# Patient Record
Sex: MALE | Race: WHITE
[De-identification: names, ages, dates, MRNs, and addresses within clinical notes are randomized per-mention and may not be internally consistent; named-entity substitution may affect disease eponyms.]

---

## 2018-11-06 ENCOUNTER — HOSPITAL ENCOUNTER (EMERGENCY)
Dept: HOSPITAL 75 - ER | Age: 68
Discharge: HOME | End: 2018-11-06
Payer: COMMERCIAL

## 2018-11-06 VITALS — WEIGHT: 170 LBS | HEIGHT: 69 IN | BODY MASS INDEX: 25.18 KG/M2

## 2018-11-06 VITALS — DIASTOLIC BLOOD PRESSURE: 80 MMHG | SYSTOLIC BLOOD PRESSURE: 163 MMHG

## 2018-11-06 DIAGNOSIS — Z87.448: ICD-10-CM

## 2018-11-06 DIAGNOSIS — L03.115: Primary | ICD-10-CM

## 2018-11-06 DIAGNOSIS — M79.604: ICD-10-CM

## 2018-11-06 DIAGNOSIS — I73.9: ICD-10-CM

## 2018-11-06 DIAGNOSIS — Z86.19: ICD-10-CM

## 2018-11-06 LAB
ALBUMIN SERPL-MCNC: 3.8 GM/DL (ref 3.2–4.5)
ALP SERPL-CCNC: 93 U/L (ref 40–136)
ALT SERPL-CCNC: 28 U/L (ref 0–55)
APTT BLD: 34 SEC (ref 24–35)
BASOPHILS # BLD AUTO: 0 10^3/UL (ref 0–0.1)
BASOPHILS NFR BLD AUTO: 0 % (ref 0–10)
BILIRUB SERPL-MCNC: 0.5 MG/DL (ref 0.1–1)
BUN/CREAT SERPL: 15
CALCIUM SERPL-MCNC: 9.6 MG/DL (ref 8.5–10.1)
CHLORIDE SERPL-SCNC: 105 MMOL/L (ref 98–107)
CO2 SERPL-SCNC: 23 MMOL/L (ref 21–32)
CREAT SERPL-MCNC: 0.85 MG/DL (ref 0.6–1.3)
EOSINOPHIL # BLD AUTO: 0.7 10^3/UL (ref 0–0.3)
EOSINOPHIL NFR BLD AUTO: 8 % (ref 0–10)
ERYTHROCYTE [DISTWIDTH] IN BLOOD BY AUTOMATED COUNT: 13 % (ref 10–14.5)
GFR SERPLBLD BASED ON 1.73 SQ M-ARVRAT: > 60 ML/MIN
GLUCOSE SERPL-MCNC: 101 MG/DL (ref 70–105)
HCT VFR BLD CALC: 41 % (ref 40–54)
HGB BLD-MCNC: 13.8 G/DL (ref 13.3–17.7)
INR PPP: 1.1 (ref 0.8–1.4)
LYMPHOCYTES # BLD AUTO: 1.4 X 10^3 (ref 1–4)
LYMPHOCYTES NFR BLD AUTO: 15 % (ref 12–44)
MANUAL DIFFERENTIAL PERFORMED BLD QL: NO
MCH RBC QN AUTO: 31 PG (ref 25–34)
MCHC RBC AUTO-ENTMCNC: 33 G/DL (ref 32–36)
MCV RBC AUTO: 94 FL (ref 80–99)
MONOCYTES # BLD AUTO: 1 X 10^3 (ref 0–1)
MONOCYTES NFR BLD AUTO: 11 % (ref 0–12)
NEUTROPHILS # BLD AUTO: 6 X 10^3 (ref 1.8–7.8)
NEUTROPHILS NFR BLD AUTO: 66 % (ref 42–75)
PLATELET # BLD: 292 10^3/UL (ref 130–400)
PMV BLD AUTO: 9.6 FL (ref 7.4–10.4)
POTASSIUM SERPL-SCNC: 3.9 MMOL/L (ref 3.6–5)
PROT SERPL-MCNC: 7.8 GM/DL (ref 6.4–8.2)
PROTHROMBIN TIME: 13.7 SEC (ref 12.2–14.7)
RBC # BLD AUTO: 4.41 10^6/UL (ref 4.35–5.85)
SODIUM SERPL-SCNC: 140 MMOL/L (ref 135–145)
WBC # BLD AUTO: 9.2 10^3/UL (ref 4.3–11)

## 2018-11-06 PROCEDURE — 83605 ASSAY OF LACTIC ACID: CPT

## 2018-11-06 PROCEDURE — 36415 COLL VENOUS BLD VENIPUNCTURE: CPT

## 2018-11-06 PROCEDURE — 93970 EXTREMITY STUDY: CPT

## 2018-11-06 PROCEDURE — 80053 COMPREHEN METABOLIC PANEL: CPT

## 2018-11-06 PROCEDURE — 87040 BLOOD CULTURE FOR BACTERIA: CPT

## 2018-11-06 PROCEDURE — 85025 COMPLETE CBC W/AUTO DIFF WBC: CPT

## 2018-11-06 PROCEDURE — 93925 LOWER EXTREMITY STUDY: CPT

## 2018-11-06 PROCEDURE — 85730 THROMBOPLASTIN TIME PARTIAL: CPT

## 2018-11-06 PROCEDURE — 85610 PROTHROMBIN TIME: CPT

## 2018-11-06 NOTE — XMS REPORT
Continuity of Care Document

 Created on: 2018



NATY ARENAS

External Reference #: [id number]

: 1950

Sex: Male



Demographics







 Address  [address]

[place]  [address]

 

 Home Phone  [phone] x

 

 Preferred Language  Unknown

 

 Marital Status  Unknown

 

 Latter day Affiliation  Unknown

 

 Race  Unknown

 

 Ethnic Group  Unknown





Author







 Author  [facility]

 

 Organization  [facility]

 

 Address  Unknown

 

 Phone  Unavailable



              



Allergies

      





 Active            Description            Code            Type            
Severity            Reaction            Onset            Reported/Identified   
         Relationship to Patient            Clinical Status        

 

 Yes            No Known Drug Allergies            [id number]            Drug 
Allergy            Unknown            N/A                         2012   
                               



                  



Medications

      



There is no data.                  



Problems

      





 Date Dx Coded            Attending            Type            Code            
Diagnosis            Diagnosed By        

 

 2014            TAWIL MD, ESPINOZA VENTURA            Ot            604.90       
                           



                  



Procedures

      



There is no data.                  



Results

      



There is no data.              



Encounters

      





 ACCT No.            Visit Date/Time            Discharge            Status    
        Pt. Type            Provider            Facility            Loc./Unit  
          Complaint        

 

 [id number]            2014 18:35:00            2014 20:49:00    
        [facility]            Emergency                                               
             

 

 [id number]            2014 19:54:00            2014 00:01:00    
        [facility]            Outpatient            TAWIL MD, ESPINOZA VENTURA            [facility]            [facility]                     

 

 [id number]            2014 10:20:00            2014 17:00:00    
        [facility]            Inpatient                                               
             

 

 [id number]            2014 15:08:00            2014 23:59:59    
        [facility]            Outpatient                                              
              

 

 [id number]            2013 14:15:00            2013 15:15:00    
        [facility]            Outpatient                                              
              

 

 [id number]            2013 08:16:00            2013 23:59:59    
        [facility]            Outpatient

## 2018-11-06 NOTE — ED LOWER EXTREMITY
General


Chief Complaint:  Lower Extremity


Stated Complaint:  LEG INFECTION


Nursing Triage Note:  


PT SENT OVER FROM [facility]. PT HAS POSSIBLE RIGHT LEG INFECTION. PT 


STATES HIS LEG HAS BEEN LIKE THIS FOR 2 WEEKS.


Nursing Sepsis Screen:  No Definite Risk


Source:  patient


Exam Limitations:  no limitations





History of Present Illness


Date Seen by Provider:  2018


Time Seen by Provider:  14:45


Initial Comments


Patient is a 68-year-old male who presents to the emergency room from Dr. Hare's office for a possible right leg infection. Patient has redness, 

swelling, skin flaking off to the right lower extremity around the area of his 

shin and has significant pedal edema on the right leg. His left leg is also 

swollen, brown discoloration in color, and skin flaking off. There is no 

redness noted to the left leg. He reports that the redness in the right leg 

started when he fell 2 weeks ago and scraped his right knee. He has been seen a 

couple of times in Dr. Hare's office and he's been on Keflex by mouth.


Onset:  just prior to arrival


Pain/Injury Location:  bilateral leg


Method of Injury:  fell





Allergies and Home Medications


Allergies


Coded Allergies:  


     No Known Drug Allergies (Unverified , 3/23/12)





Home Medications


Amlodipine Besylate 5 Mg Tablet, 5 MG PO DAILY, (Reported)


Finasteride 5 Mg Tab, 5 MG PO HS, (Reported)


Meclizine Hcl 25 Mg Tab, 1 TAB PO TID PRN for DIZZINESS


   Prescribed by: CELSA ELLIS on 14


Metoprolol Succinate 50 Mg Tab.sr.24h, 50 MG PO DAILY, (Reported)


Pantoprazole Sodium 40 Mg Tablet.dr, 40 MG PO DAILY, (Reported)


   TAKE 30 MINUTES BEFORE BREAKFAST 


Pravastatin Sodium 20 Mg Tablet, 20 MG PO DAILY, (Reported)


Sulfamethoxazole/Trimethoprim 1 Each Tablet, 1 EACH PO BID


   Prescribed by: LUIS MIGUEL HARVEY on 18 547





Patient Home Medication List


Home Medication List Reviewed:  Yes





Review of Systems


Constitutional:  no symptoms reported, see HPI; No chills, No fever


Skin:  see HPI, change in color, other (swelling to the lower extremities 

bilaterally, brown discoloration to the left leg, redness to the right lower 

leg.)





Past Medical-Social-Family Hx


Past Med/Social Hx:  Reviewed Nursing Past Med/Soc Hx


Patient Social History


Alcohol Use:  Denies Use


Recreational Drug Use:  No


Smoking Status:  Never a Smoker


Recent Foreign Travel:  No


Contact w/Someone Who Travel:  No


Recent Infectious Disease Expo:  No


Recent Hopitalizations:  No





Immunizations Up To Date


Tetanus Booster (TDap):  Unknown





Past Medical History


Surgeries:  Yes (APPY, HEART CATH X'S 2)


Respiratory:  No


Cardiac:  Yes (SVT)


Neurological:  Yes (Pt says "herpes")


Reproductive Disorders:  No


Benign Prostatic Hyperpl


Gastrointestinal:  No


Musculoskeletal:  No


Endocrine:  No


Cancer:  No


Psychosocial:  No


Integumentary:  No


Blood Disorders:  No





Family Medical History


Reviewed Nursing Family Hx





Patient reports no known family medical history.





Physical Exam


Vital Signs





Vital Signs - First Documented








 18





 14:45


 


Temp 98.3


 


Pulse 86


 


Resp 18


 


B/P (MAP) 176/82 (113)


 


Pulse Ox 99


 


O2 Delivery Room Air





Capillary Refill : Less Than 3 Seconds


Height, Weight, BMI


Height: 5'9.00"


Weight: 170lbs. 8oz. 77.087889fg;  BMI


Method:Stated


General Appearance:  WD/WN, no apparent distress


Cardiovascular:  normal peripheral pulses, regular rate, rhythm, no edema, no 

gallop, no JVD, no murmur


Respiratory:  chest non-tender, lungs clear, normal breath sounds, no 

respiratory distress, no accessory muscle use


Legs:  right leg soft tissue tenderness; bilateral leg swelling (pedal edema, 

flaking skin), bilateral leg other (left leg has a brown discoloration to his 

shin and left ankle, right leg is red in color and warm to the touch.)


Ankles:  bilateral ankle swelling


Neurologic/Tendon:  normal sensation, normal motor functions, normal tendon 

functions, responds to pain, no evidence tendon injury


Neurologic/Psychiatric:  alert, normal mood/affect, oriented x 3


Skin:  normal color, warm/dry


Normal capillary refill and distal pedal pulses.





Progress/Results/Core Measures


Results/Orders


Lab Results





Laboratory Tests








Test


 18


14:45 Range/Units


 


 


White Blood Count


 9.2 


 4.3-11.0


10^3/uL


 


Red Blood Count


 4.41 


 4.35-5.85


10^6/uL


 


Hemoglobin 13.8  13.3-17.7  G/DL


 


Hematocrit 41  40-54  %


 


Mean Corpuscular Volume 94  80-99  FL


 


Mean Corpuscular Hemoglobin 31  25-34  PG


 


Mean Corpuscular Hemoglobin


Concent 33 


 32-36  G/DL





 


Red Cell Distribution Width 13.0  10.0-14.5  %


 


Platelet Count


 292 


 130-400


10^3/uL


 


Mean Platelet Volume 9.6  7.4-10.4  FL


 


Neutrophils (%) (Auto) 66  42-75  %


 


Lymphocytes (%) (Auto) 15  12-44  %


 


Monocytes (%) (Auto) 11  0-12  %


 


Eosinophils (%) (Auto) 8  0-10  %


 


Basophils (%) (Auto) 0  0-10  %


 


Neutrophils # (Auto) 6.0  1.8-7.8  X 10^3


 


Lymphocytes # (Auto) 1.4  1.0-4.0  X 10^3


 


Monocytes # (Auto) 1.0  0.0-1.0  X 10^3


 


Eosinophils # (Auto)


 0.7 H


 0.0-0.3


10^3/uL


 


Basophils # (Auto)


 0.0 


 0.0-0.1


10^3/uL


 


Prothrombin Time 13.7  12.2-14.7  SEC


 


INR Comment 1.1  0.8-1.4  


 


Activated Partial


Thromboplast Time 34 


 24-35  SEC





 


Sodium Level 140  135-145  MMOL/L


 


Potassium Level 3.9  3.6-5.0  MMOL/L


 


Chloride Level 105    MMOL/L


 


Carbon Dioxide Level 23  21-32  MMOL/L


 


Anion Gap 12  5-14  MMOL/L


 


Blood Urea Nitrogen 13  7-18  MG/DL


 


Creatinine


 0.85 


 0.60-1.30


MG/DL


 


Estimat Glomerular Filtration


Rate > 60 


  





 


BUN/Creatinine Ratio 15   


 


Glucose Level 101    MG/DL


 


Lactic Acid Level


 1.33 


 0.50-2.00


MMOL/L


 


Calcium Level 9.6  8.5-10.1  MG/DL


 


Corrected Calcium 9.8  8.5-10.1  MG/DL


 


Total Bilirubin 0.5  0.1-1.0  MG/DL


 


Aspartate Amino Transf


(AST/SGOT) 24 


 5-34  U/L





 


Alanine Aminotransferase


(ALT/SGPT) 28 


 0-55  U/L





 


Alkaline Phosphatase 93    U/L


 


Total Protein 7.8  6.4-8.2  GM/DL


 


Albumin 3.8  3.2-4.5  GM/DL








Micro Results





Microbiology


18 Blood Culture - Preliminary, Resulted


          No growth


11/6/18 Blood Culture - Final, Complete


          No growth


18 Blood Culture - Final, Complete


          No growth





My Orders





Orders - LUIS MIGUEL HARVEY


Cbc With Automated Diff (18 14:36)


Comprehensive Metabolic Panel (18 14:36)


Blood Culture (18 14:36)


Protime With Inr (18 14:36)


Partial Thromboplastin Time (18 14:36)


Saline Lock/Iv-Start (18 14:36)


Vital Signs Adult Sepsis Patie Q15M (18 14:36)


O2 (18 14:36)


Remove Rings In Anticipation O (18 14:36)


Lactic Acid Analyzer (18 14:36)


Blood Culture (18 15:08)


Us Brianda Lower Ext [id number] (18 15:41)


Us Venous Lower Ext Brianda (18 15:41)





Vital Signs/I&O











 18





 14:45 17:59


 


Temp 98.3 


 


Pulse 86 85


 


Resp 18 20


 


B/P (MAP) 176/82 (113) 163/80 (107)


 


Pulse Ox 99 99


 


O2 Delivery Room Air 














Blood Pressure Mean:  113











Progress


Progress Note :  


   Time:  17:50


Progress Note


I have seen and evaluated the patient. I have spoke to Dr. Hare regarding 

the patients US findings. He would like the patient to see the patient in his 

office next thursday and to call Dr Pickering office for an appointment time. The 

patient agrees with plan of care, return precautions were given.





Diagnostic Imaging





   Diagonstic Imaging:  Ultrasound


   Plain Films/CT/US/NM/MRI:  leg


Comments





NAME:      NATY ARENAS [facility] REC#:   [id number]


ACCOUNT#:   [id number]


PHYSICIAN:    LUIS MIGUEL HARVEY


 








CC:   LUIS MIGUEL HARVEY; SARAI SANZ MD


 Page 2 of 2


 RADIOLOGY REPORT





 [facility].


 [place], [place]





CC:   LUIS MIGUEL HARVEY; SARAI SANZ MD


 Page 1 of 1


 RADIOLOGY REPORT





NAME:      NATY ARENAS


[facility] REC#:   [id number]


ACCOUNT#:   [id number]


PT STATUS:   REG ER


:      1950


PHYSICIAN:    LUIS MIGUEL HARVEY


ADMIT DATE:   18/ER


 ***Signed***


Date of Exam:   18





US BRIANDA LOWER EXT [id number]


 





PROCEDURE: US Bilateral lower extremity arterial.





TECHNIQUE: Multiple real-time grayscale images are obtained


through both lower extremity arterial systems with color Doppler


imaging and color Doppler spectral analysis.





INDICATION: Bilateral lower extremity swelling with some


discoloration.





FINDINGS: Real-time imaging shows scattered plaquing throughout


the lower extremity arterial system bilaterally.





The right lower extremity shows normal biphasic flow from the


external iliac artery to the popliteal level. There is monophasic


flow in the distal popliteal artery with monophasic flow to the


anterior tibial artery. Posterior tibial artery appears occluded.





Left lower extremity shows biphasic flow from the external iliac


vein throughout to the ankle. There is good velocity in the


posterior tibial and dorsalis pedis artery with biphasic


waveform.





IMPRESSION:


1. Hemodynamic changes are present estimated greater than 70% in


the distal popliteal artery on the right with occlusion of the


posterior tibial artery.


2. Moderate atherosclerotic changes on the left with no


hemodynamic stenosis demonstrated with Doppler sampling.





Dictated by: 





  Dictated on workstation # [id number]





[id number]





Dict:      18


Trans:      18





Interpreted by:         SARAI SANZ MD


Electronically signed by:   SARAI SANZ MD 18   





NAME:      NATY ARENAS


[facility] REC#:   [id number]


ACCOUNT#:   [id number]


PHYSICIAN:    LUIS MIGUEL HARVEY


 








CC:   LUIS MIGUEL HARVEY; SARAI SANZ MD


 Page 1 of 1


 RADIOLOGY REPORT





 [facility] [place], [place]





CC:   LONNIE HARVEY ERIC D MD


 Page 1 of 1


 RADIOLOGY REPORT





NAME:      NATY ARENAS


MED REC#:   [id number]


ACCOUNT#:   [id number]


PT STATUS:   REG ER


:      1950


PHYSICIAN:    LUIS MIGUEL HARVEY


ADMIT DATE:   18/ER


 ***Signed***


Date of Exam:   18





US VENOUS LOWER EXT BRIANDA


 





PROCEDURE: US Venous Lower Ext Brianda.





TECHNIQUE: Multiple real-time grayscale images were obtained over


the lower extremities in various projections, bilaterally.


Additional duplex Doppler and color Doppler images were also


obtained.





INDICATION: Bilateral lower extremity edema.





FINDINGS: There is normal color Doppler imaging from the external


iliac vein to the ankle bilaterally. Calf compression shows


normal augmentation of flow in the popliteal vein bilaterally. No


evidence of popliteal cyst.





IMPRESSION: No evidence of lower extremity venous thrombosis.





Dictated by: 





  Dictated on workstation # [id number]





[id number]





Dict:      18 1643


Trans:      184





Interpreted by:         SARAI SANZ MD


Electronically signed by:   SARAI SANZ 


   Reviewed:  Reviewed by Me, Reviewed/Discussed





Departure


Impression





 Primary Impression:  


 Cellulitis


 Additional Impression:  


 Vascular disease, peripheral


Disposition:  01 HOME, SELF-CARE


Condition:  Stable/Unchanged





Departure-Patient Inst.


Decision time for Depature:  17:51


Referrals:  


MENDY HARE DO (PCP/Family)


Primary Care Physician








EDNA LYONS MD


Patient Instructions:  Cellulitis (Skin Infection), Adult (DC)





Add. Discharge Instructions:  


Take medication as directed. Follow-up with Dr. Hare's office on Thursday. 

Call tomorrow morning for an appointment time. Call Dr. Lyons's office to 

schedule an appointment in the near future for discussion of your ultrasound 

findings. Return back to the emergency room for any worsening symptoms or 

concerns as needed. All discharge instructions reviewed with patient and/or 

family. Voiced understanding.


Scripts


Sulfamethoxazole/Trimethoprim (Bactrim Ds Tablet) 1 Each Tablet


1 EACH PO BID for 7 Days, #14 TAB


   Prov: LUIS MIGUEL HARVEY         18











LUIS MIGUEL HARVEY 2018 16:06

## 2018-11-06 NOTE — DIAGNOSTIC IMAGING REPORT
PROCEDURE: US Venous Lower Ext Rob.



TECHNIQUE: Multiple real-time grayscale images were obtained over

the lower extremities in various projections, bilaterally.

Additional duplex Doppler and color Doppler images were also

obtained.



INDICATION: Bilateral lower extremity edema.



FINDINGS: There is normal color Doppler imaging from the external

iliac vein to the ankle bilaterally. Calf compression shows

normal augmentation of flow in the popliteal vein bilaterally. No

evidence of popliteal cyst.



IMPRESSION: No evidence of lower extremity venous thrombosis.



Dictated by: 



  Dictated on workstation # [id number]

## 2018-11-06 NOTE — DIAGNOSTIC IMAGING REPORT
PROCEDURE: US Bilateral lower extremity arterial.



TECHNIQUE: Multiple real-time grayscale images are obtained

through both lower extremity arterial systems with color Doppler

imaging and color Doppler spectral analysis.



INDICATION: Bilateral lower extremity swelling with some

discoloration.



FINDINGS: Real-time imaging shows scattered plaquing throughout

the lower extremity arterial system bilaterally.



The right lower extremity shows normal biphasic flow from the

external iliac artery to the popliteal level. There is monophasic

flow in the distal popliteal artery with monophasic flow to the

anterior tibial artery. Posterior tibial artery appears occluded.



Left lower extremity shows biphasic flow from the external iliac

vein throughout to the ankle. There is good velocity in the

posterior tibial and dorsalis pedis artery with biphasic

waveform.



IMPRESSION:

1. Hemodynamic changes are present estimated greater than 70% in

the distal popliteal artery on the right with occlusion of the

posterior tibial artery.

2. Moderate atherosclerotic changes on the left with no

hemodynamic stenosis demonstrated with Doppler sampling.



Dictated by: 



  Dictated on workstation # [id number]

## 2018-11-06 NOTE — XMS REPORT
Continuity of Care Document

 Created on: 2013



NATY ARENAS

External Reference #: [id number]

: 1950

Sex: Male



Demographics







 Address  [address]

[place]  [address]

 

 Home Phone  [phone]

 

 Preferred Language  Unknown

 

 Marital Status  Unknown

 

 Mu-ism Affiliation  Unknown

 

 Race  Unknown

 

 Ethnic Group  Unknown





Author







 Author  [facility]

 

 Organization  [facility]

 

 Address  Unknown

 

 Phone  Unavailable







Support







 Name  Relationship  Address  Phone

 

 LUZ MORATAYA  Next Of Kin  [address] [place]  [address] [phone]







Care Team Providers







 Care Team Member Name  Role  Phone

 

 MENDY HARE DO  PP  [phone]



                                            



Insurance Providers

                      





 Payer Name                    Policy Number                    Subscriber Name
                    Relationship                

 

 Self Pay                                         Naty Arenas                
    01 Self / Same As Patient                



                                                                    



Advance Directives

                      





 Directive                    Response                    Recorded Date        
        

 

 Advance Directives                    N                    13 2:30pm    
            

 

 Health Care Power of                     N                    13 
2:30pm                

 

 Organ Donor                    N                    13 2:30pm           
     



                                                                               
         



Problems

          No Known Problems or Medical conditions.                             
                                       



Family History

                      





 History                    Response                    Recorded Date/Time     
           

 

 Hx Family Cancer                    Y Mom - "stomach"                     2:31pm                

 

 Hx Family Breast Cancer                    N                    13 2:
31pm                

 

 Hx Family Lung Cancer                    N                    13 2:31pm 
               

 

 Hx Family Colorectal Cancer                    N                    13 2:
31pm                

 

 Hx Family Myocardial Infarction                    Y Sister                    
13 2:31pm                



                                                                    



Social History

                      





 History                    Response                    Recorded Date/Time     
           

 

 Alcohol Use                    Denies Use                    13 2:31pm  
              

 

 Recreational Drug Use                    N                    13 2:31pm 
               

 

 Recent Foreign Travel                    N                    13 2:31pm 
               

 

 Recent Infectious Disease Exposure                    N                     2:31pm                

 

 Hospitalization with Isolation                    Unknown                     8:29pm                



                                                                               
         



Allergies, Adverse Reactions, Alerts

                      





 Allergen                    Type                    Severity                   
 Reaction                    Last Updated                

 

 No Known Drug Allergies                                                       
                            12                



                                                                               
                   



Medications

                      





 Medication                    Dose                    Units                    
Route                    Sig                    Qty                    Days    
            

 

 Pantoprazole Sodium (Protonix)                    40                    Mg    
                PO                    DAILY                    30              
                       

 

 Mupirocin (Bactroban Ointment 22 Gm)                    0                     
                    TP                    BID                                  
                        

 

 Amlodipine Besylate                    5                    Mg                
    PO                    DAILY                                                
          

 

 Metoprolol Succinate (Metoprolol Succinate Xl 50 Mg)                    50    
                Mg                    PO                    DAILY              
                                            

 

 Cephalexin Monohydrate (Cephalexin)                    500                    
Mg                    PO                    TID                                
                          

 

 Furosemide                    40                    Mg                    PO  
                  EVERY AFTERNOON                                              
            

 

 Potassium Chloride                    10                    Meq               
     PO                    EVERY AFTERNOON                                     
                     

 

 Trimethoprim/Sulfamethoxazole (Bactrim Ds)                    1               
     Tab                    PO                    BID                          
                                

 

 Pravastatin Sodium (Pravachol)                    20                    Mg    
                PO                    DAILY                                    
                      

 

 Aspirin (Aspirin Ec 81 Mg)                    81                    Mg        
            PO                    DAILY                                        
                  

 

 Finasteride (Proscar)                    5                    Mg              
      PO                    HS                                                 
         

 

 Cephalexin Hcl (Keflex Capsule)                    250                    Mg  
                  PO                                                           
                    

 

 Metoprolol Succinate (Metoprolol Succinate Xl 25 Mg)                    1     
               Each                    PO                    DAILY             
       10                                     

 

 Finasteride (Proscar)                    5                    Mg              
      PO                    DAILY                    10                        
             

 

 Aspirin (Aspirin Ec 81 Mg)                    81                    Mg        
            PO                    DAILY                                        
                  

 

 Lisinopril (Zestril)                    10                    Mg              
      PO                    DAILY                                              
            

 

 Metoprolol Tartrate (Metoprolol Tartrate 50 Mg)                    50         
           Each                    PO                    DAILY                 
                                         

 

 [Lisinopril]                    1                    Tab                    PO
                    DAILY                                                      
    



                                                                               
                                                                               
                                                                               
           



Immunizations

                      





 Name                    Given                    Type                

 

 pneumococcal polysaccharide PPV23                    13                 
   A                

 

 pneumococcal polysaccharide PPV23                    13                 
   A                



                                                                               
         



Pregnancy

                      





 Response                    Recorded Date/Time                

 

 Status not known                    Unknown                



                                                                              



Results

                      





 Test                    Date                    Result                    
Interp.                    Ref. Range                

 

 Activated Partial Thromboplast Time                    2013 11:
16am                    33  SEC                    N                    24-35  
              

 

 Alanine Aminotransferase (ALT/SGPT)                    2013 11:
16am                    47  U/L                    N                    30-65  
              

 

 Albumin                    2013 11:16am                    4.2  G/
DL                    N                    3.4-5.0                

 

 Alkaline Phosphatase                    2013 11:16am              
      130  U/L                    N                                    

 

 Aspartate Amino Transf (AST/SGOT)                    2013 11:16am 
                   27  U/L                    N                    15-37       
         

 

 BUN/Creatinine Ratio                    2013 7:32am               
     15                                         -                

 

 Band Neutrophils                    2012 8:38am                    0
  %                                         -                

 

 Basophils # (Auto)                    2013 11:16am                
    0.1  10^3/uL                    N                    0.0-0.1                

 

 Basophils % (Manual)                    2012 8:38am                 
   3  %                                         -                

 

 Basophils (%) (Auto)                    2013 11:16am              
      1  %                    N                    0-10                

 

 Blood Urea Nitrogen                    2013 7:32am                
    15  MG/DL                    N                    7-18                

 

 Calcium Level                    2013 7:32am                    
8.2  MG/DL                    L                    8.5-10.1                

 

 Carbon Dioxide Level                    2013 7:32am               
     22  MMOL/L                    N                    21-32                

 

 Chloride Level                    2013 7:32am                    
108  MMOL/L                    N                    101-110                

 

 Cholesterol Level                    2013 7:32am                  
  186  MG/DL                    N                    -200                

 

 Creatine Kinase MB                    2012 3:25am                    
9.5  NG/ML                    PH                    0.0-3.6                

 

 Creatine Kinase MB Relative Index                    2012 3:25am    
                3.7  %                    H                    0.0-2.5         
       

 

 Creatinine                    2013 7:32am                    1.0  
MG/DL                    N                    0.6-1.3                

 

 Eosinophils # (Auto)                    2013 11:16am              
      0.7  10^3/uL                    H                    0.0-0.3             
   

 

 Eosinophils % (Manual)                    2012 8:38am               
     9  %                                         -                

 

 Eosinophils (%) (Auto)                    2013 11:16am            
        8  %                    N                    0-10                

 

 Glucose Level                    2013 7:32am                    
154  MG/DL                    H                                    

 

 HDL Cholesterol                    2013 7:32am                    
47  MG/DL                    N                    35-60                

 

 Hematocrit                    2013 7:32am                    40  %
                    N                    40-54                

 

 Hemoglobin                    2013 7:32am                    13.4  
G/DL                    N                    13.3-17.7                

 

 LDL Cholesterol                    2013 7:32am                    
121  MG/DL                    N                    0-129                

 

 Lymphocytes # (Auto)                    2013 11:16am              
      2.0  X 10^3                    N                    1.0-4.0              
  

 

 Lymphocytes % (Manual)                    2012 8:38am               
     25  %                                         -                

 

 Lymphocytes (%) (Auto)                    2013 11:16am            
        23  %                    N                    12-44                

 

 Magnesium Level                    2013 11:16am                    
1.6  MG/DL                    L                    1.8-2.4                

 

 Mean Corpuscular Hemoglobin                    2013 7:32am        
            31  PG                    N                    25-34                

 

 Mean Corpuscular Hemoglobin Concent                    2013 7:32am
                    34  G/DL                    N                    32-36     
           

 

 Mean Corpuscular Volume                    2013 7:32am            
        93  FL                    N                    80-99                

 

 Mean Platelet Volume                    2013 7:32am               
     10.0  FL                    N                    7.4-10.4                

 

 Monocytes # (Auto)                    2013 11:16am                
    0.6  X 10^3                    N                    0.0-1.0                

 

 Monocytes % (Manual)                    2012 8:38am                 
   10  %                                         -                

 

 Monocytes (%) (Auto)                    2013 11:16am              
      7  %                    N                    0-12                

 

 Myoglobin                    2013 11:16am                    207  
UG/L                    H                    10-92                

 

 Neutrophils # (Auto)                    2013 11:16am              
      5.3  X 10^3                    N                    1.8-7.8              
  

 

 Neutrophils % (Manual)                    2012 8:38am               
     53  %                                         -                

 

 Neutrophils (%) (Auto)                    2013 11:16am            
        61  %                    N                    42-75                

 

 Platelet Count                    2013 7:32am                    
190  10^3/uL                    N                    130-400                

 

 Potassium Level                    2013 7:32am                    
3.9  MMOL/L                    N                    3.6-5.0                

 

 Prothromb Time International Ratio                    2013 11:16am
                    0.9                    N                    0.8-1.4        
        

 

 Prothrombin Time                    2013 11:16am                  
  12.6  SEC                    N                    12.2-14.7                

 

 Red Blood Count                    2013 7:32am                    
4.28  10^6/uL                    L                    4.35-5.85                

 

 Red Cell Distribution Width                    2013 7:32am        
            12.8  %                    N                    10.0-14.5          
      

 

 Sodium Level                    2013 7:32am                    139
  MMOL/L                    N                    135-145                

 

 Total Bilirubin                    2013 11:16am                    
0.7  MG/DL                    N                    0.0-1.0                

 

 Total Protein                    2013 11:16am                    
9.1  G/DL                    H                    6.4-8.2                

 

 Triglycerides Level                    2013 7:32am                
    90  MG/DL                    N                    30.0-150.0                

 

 Troponin I                    2013 11:16am                    < 
0.10  NG/ML                                         0.00-0.10                

 

 Urine Bacteria                    2013 11:40am                    
NEGATIVE  /HPF                                         -                

 

 Urine Bilirubin                    2013 11:40am                    
NEGATIVE                                         -                

 

 Urine Casts                    2013 11:40am                    
NONE  /LPF                                         -                

 

 Urine Clarity                    2013 11:40am                    
CLEAR                                         -                

 

 Urine Color                    2013 11:40am                    
YELLOW                                         -                

 

 Urine Crystals                    2013 11:40am                    
NONE  /LPF                                         -                

 

 Urine Culture Indicated                    2013 11:40am           
         NO                                         -                

 

 Urine Glucose (UA)                    2013 11:40am                
    NEGATIVE                                         -                

 

 Urine Ketones                    2013 11:40am                    
NEGATIVE                                         -                

 

 Urine Leukocyte Esterase                    2013 11:40am          
          NEGATIVE                                         -                

 

 Urine Mucus                    2013 11:40am                    
NEGATIVE  /LPF                                         -                

 

 Urine Nitrite                    2013 11:40am                    
NEGATIVE                                         -                

 

 Urine Protein                    2013 11:40am                    
NEGATIVE                                         -                

 

 Urine RBC                    2013 11:40am                    NONE  
/HPF                                         -                

 

 Urine Specific Gravity                    2013 11:40am            
        1.010                    L                    -                

 

 Urine Urobilinogen                    2013 11:40am                
    NORMAL  MG/DL                                         -                

 

 Urine WBC                    2013 11:40am                    NONE  
/HPF                                         -                

 

 Urine pH                    2013 11:40am                    6     
                                    -                

 

 VLDL Cholesterol                    2013 7:32am                    
18  MG/DL                    N                    5-40                

 

 White Blood Count                    2013 7:32am                  
  6.8  10^3/uL                    N                    4.3-11.0                

 

 Lab Scanned Report                    2013 1:09pm                 
   LAB Reports  [id number]                                         -              
  

 

 Estimat Glomerular Filtration Rate                    2013 11:16am
                    56                                         -                

 

 Blood Morphology Comment                    2012 8:38am             
       NORMAL                                         -                

 

 Creatine Kinase                    2013 11:16am                    
376  U/L                    H                    1-205                

 

 Cardiac Panel Pathologist Review                    2013 11:16am  
                  SEE CARDIAC PATH REV                                         
-                

 

 Urine RBC (Auto)                    2013 11:40am                  
  NEGATIVE                                         -                



                                                                               
                                                                               
                                                                               
                                                                               
                                                                               
                                                                               
                                                                               
                                                                               
                                                                               
                                                                               
                                                          



Procedures

                      





 Procedure                    Code                    Date                

 

 LEFT HEART CARDIAC CATH                    37.22                    12  
              

 

 LT HEART ANGIOCARDIOGRAM                    88.53                    12 
               

 

 CORONAR ARTERIOGR-2 CATH                    88.56                    12 
               

 

 LEFT HEART CARDIAC CATH                    37.22                    13  
              

 

 LT HEART ANGIOCARDIOGRAM                    88.53                    13 
               

 

 CORONAR ARTERIOGR-2 CATH                    88.56                    13 
               



                                                                               
                                                 



Encounters

                      





 Encounter                    Location                    Date/Time            
    

 

 Discharged Inpatient                    [facility]                     2:15pm                

 

 Departed Emergency Room                    [facility]                     12:26pm

## 2018-11-06 NOTE — XMS REPORT
Continuity of Care Document

 Created on: 2013



NATY ARENAS

External Reference #: [id number]

: 1950

Sex: Male



Demographics







 Address  [address]

[place]  [address]

 

 Home Phone  [phone]

 

 Preferred Language  Unknown

 

 Marital Status  Unknown

 

 Sabianist Affiliation  Unknown

 

 Race  Unknown

 

 Ethnic Group  Unknown





Author







 Author  [facility]

 

 Organization  [facility]

 

 Address  Unknown

 

 Phone  Unavailable







Support







 Name  Relationship  Address  Phone

 

 LUZ MORATAYA  Next Of Kin  [address] [place]  [address] [phone]







Care Team Providers







 Care Team Member Name  Role  Phone

 

 MENDY HARE DO  PP  [phone]



                                            



Insurance Providers

                      





 Payer Name                    Policy Number                    Subscriber Name
                    Relationship                

 

 Self Pay                                         Naty Arenas                
    01 Self / Same As Patient                



                                                                    



Advance Directives

                      





 Directive                    Response                    Recorded Date        
        

 

 Advance Directives                    N                    13 2:30pm    
            

 

 Health Care Power of                     N                    13 
2:30pm                

 

 Organ Donor                    N                    13 2:30pm           
     



                                                                               
         



Problems

          No Known Problems or Medical conditions.                             
                                       



Family History

                      





 History                    Response                    Recorded Date/Time     
           

 

 Hx Family Cancer                    Y Mom - "stomach"                     2:31pm                

 

 Hx Family Breast Cancer                    N                    13 2:
31pm                

 

 Hx Family Lung Cancer                    N                    13 2:31pm 
               

 

 Hx Family Colorectal Cancer                    N                    13 2:
31pm                

 

 Hx Family Myocardial Infarction                    Y Sister                    
13 2:31pm                



                                                                    



Social History

                      





 History                    Response                    Recorded Date/Time     
           

 

 Alcohol Use                    Denies Use                    13 2:31pm  
              

 

 Recreational Drug Use                    N                    13 2:31pm 
               

 

 Recent Foreign Travel                    N                    13 2:31pm 
               

 

 Recent Infectious Disease Exposure                    N                     2:31pm                

 

 Hospitalization with Isolation                    Unknown                     9:44am                



                                                                               
         



Allergies, Adverse Reactions, Alerts

                      





 Allergen                    Type                    Severity                   
 Reaction                    Last Updated                

 

 No Known Drug Allergies                                                       
                            12                



                                                                               
                   



Medications

                      





 Medication                    Dose                    Units                    
Route                    Sig                    Qty                    Days    
            

 

 Pantoprazole Sodium (Protonix)                    40                    Mg    
                PO                    DAILY                    30              
                       

 

 Mupirocin (Bactroban Ointment 22 Gm)                    0                     
                    TP                    BID                                  
                        

 

 Amlodipine Besylate                    5                    Mg                
    PO                    DAILY                                                
          

 

 Metoprolol Succinate (Metoprolol Succinate Xl 50 Mg)                    50    
                Mg                    PO                    DAILY              
                                            

 

 Cephalexin Monohydrate (Cephalexin)                    500                    
Mg                    PO                    TID                                
                          

 

 Furosemide                    40                    Mg                    PO  
                  EVERY AFTERNOON                                              
            

 

 Potassium Chloride                    10                    Meq               
     PO                    EVERY AFTERNOON                                     
                     

 

 Trimethoprim/Sulfamethoxazole (Bactrim Ds)                    1               
     Tab                    PO                    BID                          
                                

 

 Pravastatin Sodium (Pravachol)                    20                    Mg    
                PO                    DAILY                                    
                      

 

 Aspirin (Aspirin Ec 81 Mg)                    81                    Mg        
            PO                    DAILY                                        
                  

 

 Finasteride (Proscar)                    5                    Mg              
      PO                    HS                                                 
         

 

 Cephalexin Hcl (Keflex Capsule)                    250                    Mg  
                  PO                                                           
                    

 

 Metoprolol Succinate (Metoprolol Succinate Xl 25 Mg)                    1     
               Each                    PO                    DAILY             
       10                                     

 

 Finasteride (Proscar)                    5                    Mg              
      PO                    DAILY                    10                        
             

 

 Aspirin (Aspirin Ec 81 Mg)                    81                    Mg        
            PO                    DAILY                                        
                  

 

 Lisinopril (Zestril)                    10                    Mg              
      PO                    DAILY                                              
            

 

 Metoprolol Tartrate (Metoprolol Tartrate 50 Mg)                    50         
           Each                    PO                    DAILY                 
                                         

 

 [Lisinopril]                    1                    Tab                    PO
                    DAILY                                                      
    



                                                                               
                                                                               
                                                                               
           



Immunizations

                      





 Name                    Given                    Type                

 

 pneumococcal polysaccharide PPV23                    13                 
   A                

 

 pneumococcal polysaccharide PPV23                    13                 
   A                



                                                                               
         



Pregnancy

                      





 Response                    Recorded Date/Time                

 

 Status not known                    Unknown                



                                                                              



Results

          No Known Relevant Diagnostic Tests, Laboratory Data and/or Discharge 
Summary.                                                                    



Procedures

                      





 Procedure                    Code                    Date                

 

 LEFT HEART CARDIAC CATH                    37.22                    12  
              

 

 LT HEART ANGIOCARDIOGRAM                    88.53                    12 
               

 

 CORONAR ARTERIOGR-2 CATH                    88.56                    12 
               

 

 LEFT HEART CARDIAC CATH                    37.22                    13  
              

 

 LT HEART ANGIOCARDIOGRAM                    88.53                    13 
               

 

 CORONAR ARTERIOGR-2 CATH                    88.56                    13 
               



                                                                               
                                                 



Encounters

                      





 Encounter                    Location                    Date/Time            
    

 

 Departed Emergency Room                    [facility]                     12:26pm                

 

 Discharged Inpatient                    [facility]                     1:09pm

## 2018-11-13 ENCOUNTER — HOSPITAL ENCOUNTER (OUTPATIENT)
Dept: HOSPITAL 75 - CARD | Age: 68
End: 2018-11-13
Attending: INTERNAL MEDICINE
Payer: MEDICARE

## 2018-11-13 DIAGNOSIS — I25.10: ICD-10-CM

## 2018-11-13 DIAGNOSIS — I73.9: ICD-10-CM

## 2018-11-13 DIAGNOSIS — I08.0: Primary | ICD-10-CM

## 2018-11-13 PROCEDURE — 93306 TTE W/DOPPLER COMPLETE: CPT

## 2018-11-14 ENCOUNTER — HOSPITAL ENCOUNTER (OUTPATIENT)
Dept: HOSPITAL 75 - CATH | Age: 68
Discharge: HOME | End: 2018-11-14
Attending: INTERNAL MEDICINE
Payer: MEDICARE

## 2018-11-14 VITALS — DIASTOLIC BLOOD PRESSURE: 81 MMHG | SYSTOLIC BLOOD PRESSURE: 125 MMHG

## 2018-11-14 VITALS — HEIGHT: 66 IN | WEIGHT: 169 LBS | BODY MASS INDEX: 27.16 KG/M2

## 2018-11-14 VITALS — SYSTOLIC BLOOD PRESSURE: 133 MMHG | DIASTOLIC BLOOD PRESSURE: 67 MMHG

## 2018-11-14 VITALS — DIASTOLIC BLOOD PRESSURE: 74 MMHG | SYSTOLIC BLOOD PRESSURE: 127 MMHG

## 2018-11-14 VITALS — SYSTOLIC BLOOD PRESSURE: 147 MMHG | DIASTOLIC BLOOD PRESSURE: 81 MMHG

## 2018-11-14 VITALS — SYSTOLIC BLOOD PRESSURE: 131 MMHG | DIASTOLIC BLOOD PRESSURE: 71 MMHG

## 2018-11-14 VITALS — SYSTOLIC BLOOD PRESSURE: 167 MMHG | DIASTOLIC BLOOD PRESSURE: 79 MMHG

## 2018-11-14 VITALS — DIASTOLIC BLOOD PRESSURE: 75 MMHG | SYSTOLIC BLOOD PRESSURE: 129 MMHG

## 2018-11-14 VITALS — SYSTOLIC BLOOD PRESSURE: 120 MMHG | DIASTOLIC BLOOD PRESSURE: 68 MMHG

## 2018-11-14 VITALS — DIASTOLIC BLOOD PRESSURE: 74 MMHG | SYSTOLIC BLOOD PRESSURE: 124 MMHG

## 2018-11-14 VITALS — DIASTOLIC BLOOD PRESSURE: 73 MMHG | SYSTOLIC BLOOD PRESSURE: 136 MMHG

## 2018-11-14 DIAGNOSIS — I08.3: ICD-10-CM

## 2018-11-14 DIAGNOSIS — L03.115: ICD-10-CM

## 2018-11-14 DIAGNOSIS — I70.201: Primary | ICD-10-CM

## 2018-11-14 DIAGNOSIS — R06.09: ICD-10-CM

## 2018-11-14 DIAGNOSIS — I10: ICD-10-CM

## 2018-11-14 DIAGNOSIS — I25.10: ICD-10-CM

## 2018-11-14 DIAGNOSIS — E78.5: ICD-10-CM

## 2018-11-14 DIAGNOSIS — Z79.899: ICD-10-CM

## 2018-11-14 LAB
ALBUMIN SERPL-MCNC: 3.8 GM/DL (ref 3.2–4.5)
ALP SERPL-CCNC: 100 U/L (ref 40–136)
ALT SERPL-CCNC: 29 U/L (ref 0–55)
APTT BLD: 34 SEC (ref 24–35)
BILIRUB SERPL-MCNC: 0.5 MG/DL (ref 0.1–1)
BUN/CREAT SERPL: 12
CALCIUM SERPL-MCNC: 9.3 MG/DL (ref 8.5–10.1)
CHLORIDE SERPL-SCNC: 106 MMOL/L (ref 98–107)
CHOLEST SERPL-MCNC: 149 MG/DL (ref ?–200)
CO2 SERPL-SCNC: 23 MMOL/L (ref 21–32)
CREAT SERPL-MCNC: 1.3 MG/DL (ref 0.6–1.3)
ERYTHROCYTE [DISTWIDTH] IN BLOOD BY AUTOMATED COUNT: 13.1 % (ref 10–14.5)
GFR SERPLBLD BASED ON 1.73 SQ M-ARVRAT: 55 ML/MIN
GLUCOSE SERPL-MCNC: 93 MG/DL (ref 70–105)
HCT VFR BLD CALC: 38 % (ref 40–54)
HDLC SERPL-MCNC: 36 MG/DL (ref 40–60)
HGB BLD-MCNC: 12.7 G/DL (ref 13.3–17.7)
INR PPP: 1.2 (ref 0.8–1.4)
MCH RBC QN AUTO: 31 PG (ref 25–34)
MCHC RBC AUTO-ENTMCNC: 34 G/DL (ref 32–36)
MCV RBC AUTO: 93 FL (ref 80–99)
PLATELET # BLD: 305 10^3/UL (ref 130–400)
PMV BLD AUTO: 9.6 FL (ref 7.4–10.4)
POTASSIUM SERPL-SCNC: 4.1 MMOL/L (ref 3.6–5)
PROT SERPL-MCNC: 7.9 GM/DL (ref 6.4–8.2)
PROTHROMBIN TIME: 15.1 SEC (ref 12.2–14.7)
RBC # BLD AUTO: 4.04 10^6/UL (ref 4.35–5.85)
SODIUM SERPL-SCNC: 139 MMOL/L (ref 135–145)
TRIGL SERPL-MCNC: 87 MG/DL (ref ?–150)
VLDLC SERPL CALC-MCNC: 17 MG/DL (ref 5–40)
WBC # BLD AUTO: 7.1 10^3/UL (ref 4.3–11)

## 2018-11-14 PROCEDURE — 75716 ARTERY X-RAYS ARMS/LEGS: CPT

## 2018-11-14 PROCEDURE — 71045 X-RAY EXAM CHEST 1 VIEW: CPT

## 2018-11-14 PROCEDURE — 85027 COMPLETE CBC AUTOMATED: CPT

## 2018-11-14 PROCEDURE — 87081 CULTURE SCREEN ONLY: CPT

## 2018-11-14 PROCEDURE — 85730 THROMBOPLASTIN TIME PARTIAL: CPT

## 2018-11-14 PROCEDURE — 85610 PROTHROMBIN TIME: CPT

## 2018-11-14 PROCEDURE — 80053 COMPREHEN METABOLIC PANEL: CPT

## 2018-11-14 PROCEDURE — 36415 COLL VENOUS BLD VENIPUNCTURE: CPT

## 2018-11-14 PROCEDURE — 80061 LIPID PANEL: CPT

## 2018-11-14 NOTE — DISCHARGE INST-POST CATH
Discharge Inst-CATH


Post Cardiac Cath D/C Inst


Follow Up/Plan


Appointment with Dr. Lyons's office in 2-4 weeks


CARDIAC CATH DISCHARGE INSTRUCTIONS





*Hold Metformin for 48 hours post heart cath.





ACTIVITY





* Go Home directly and rest.


* Limit activity of the leg (or wrist if it was used) for 7 days including 

aerobics, swimming,


   jogging, bicycling, etc.


* Restrict stair-climbing for 7 days if possible, if not, climb up with your non

-cath leg, then


   bring together on the same step.


* Avoid lifting, pushing, pulling or excessive movement of the affected 

extremity for 7 days.


* Customary sexual activity may be resumed after 2 days-use caution not to use 

a position  


   that strains or causes pain to the affected extremity.


* No driving for 24 hours.


* NO SMOKING. 


* Avoid straining for bowel movements for 7 days.


* Gentle walking on level ground is allowed.


* Returning to work will depend on the type of procedure and the results. Your 

doctor will discuss


   this with you.





CALL YOUR DOCTOR FOR ANY OF THE FOLLOWING:





*If bleeding from the puncture site occurs- Apply gentle pressure to site with 

clean cloth and call


   your doctor or EMS.


* If a knot or lump forms under the skin, increases in size, or causes pain.


* If bruising appears to be worsening or moving further down your leg instead 

of disappearing.


* Temperature above 101 F.





CARE OF YOUR GROIN INCISION;





* Bruising or purple discoloration of the skin near the puncture site is common.


* You may shower only, no bathtub bathing for 5 days.  Be careful to avoid 

slipping as your


   leg may feel stiff.


* If a closure device was used on your femoral artery, please see the attached 

guide regarding


   care of the device and your leg.


* Leave the dressing on, until removed by office staff.





CARE OF YOUR WRIST INCISION;





* Bruising or purple discoloration of the skin near the puncture site is common.


* You may shower.


* DO NOT submerge wrist.


* Leave dressing on, until removed by office staff..











EDNA LYONS MD Nov 14, 2018 14:27

## 2018-11-14 NOTE — XMS REPORT
Continuity of Care Document

 Created on: 2018



DEEPABRENTPH WESLEY

External Reference #: [id number]

: 1950

Sex: Male



Demographics







 Address  [address] [place]  [address]

 

 Home Phone  [phone] x

 

 Preferred Language  Unknown

 

 Marital Status  Unknown

 

 Shinto Affiliation  Unknown

 

 Race  Unknown

 

 Ethnic Group  Unknown





Author







 Author  [facility]

 

 Organization  [facility]

 

 Address  Unknown

 

 Phone  Unavailable



              



Allergies

      





 Active            Description            Code            Type            
Severity            Reaction            Onset            Reported/Identified   
         Relationship to Patient            Clinical Status        

 

 Yes            No Known Drug Allergies            [id number]            Drug 
Allergy            Unknown            N/A                         2012   
                               



                  



Medications

      



There is no data.                  



Problems

      





 Date Dx Coded            Attending            Type            Code            
Diagnosis            Diagnosed By        

 

 2014            TAWIL MD, ELIAS A            Ot            272.0        
    PURE HYPERCHOLESTEROLEM                     

 

 2014            TAWIL MD, ELIAS A            Ot            401.9        
    HYPERTENSION NOS                     

 

 2014            TAWIL MD, ELIAS A            Ot            530.81       
     ESOPHAGEAL REFLUX                     

 

 2014            TAWIL MD, ELIAS A            Ot            600.00       
     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                     

 

 2014            TAWIL MD, ELIAS A            Ot            603.9        
    HYDROCELE NOS                     

 

 2014            TAWIL MD, ESPINOZA VENTURA            Ot            604.90       
     ORCHITIS/EPIDIDYMIT NOS                     

 

 2014            TAWIL MD, ELIAS A            Ot            607.84       
     IMPOTENCE, ORGANIC ORIGN                     

 

 2014            TAWIL MD, ELIAS A            Ot            604.90       
                           

 

 2014            KELLY RUIZ, CELSA MENDIETA            Ot            780.4 
           DIZZINESS AND GIDDINESS                     

 

 2014            KELLY RUIZ, CELSA MENDIETA            Ot            782.3 
           EDEMA                     

 

 2018            MENDY HARE DO            Ot            603.9  
          HYDROCELE NOS                     

 

 2018                         Ot            604.90            ORCHITIS/
EPIDIDYMIT NOS                     

 

 2018            LUIS MIGUEL HARVEY            Ot            I73.9            
PERIPHERAL VASCULAR DISEASE, UNSPECIFIED                     

 

 2018            LUIS MIGUEL HARVEY            Ot            L03.115         
   CELLULITIS OF RIGHT LOWER LIMB                     

 

 2018            LUIS MIGUEL HARVEY            Ot            M79.604         
   PAIN IN RIGHT LEG                     

 

 2018            LUIS MIGUEL HARVEY            Ot            Z86.19          
  PERSONAL HISTORY OF OTHER INFECTIOUS AND                     

 

 2018            LUIS MIGUEL HARVEY            Ot            Z87.448         
   PERSONAL HISTORY OF OTHER DISEASES OF UR                     

 

 2018            LUIS MIGUEL HARVEY            Ot            I73.9            
PERIPHERAL VASCULAR DISEASE, UNSPECIFIED                     

 

 2018            LUIS MIGUEL HARVEY            Ot            L03.115         
   CELLULITIS OF RIGHT LOWER LIMB                     

 

 2018            LUIS MIGUEL HARVEY            Ot            M79.604         
   PAIN IN RIGHT LEG                     

 

 2018            LUIS MIGUEL HARVEY            Ot            Z86.19          
  PERSONAL HISTORY OF OTHER INFECTIOUS AND                     

 

 2018            LUIS MIGUEL HARVEY            Ot            Z87.448         
   PERSONAL HISTORY OF OTHER DISEASES OF UR                     

 

 2018            MENDY HARE DO A            Ot            603.9  
          HYDROCELE NOS                     

 

 2018                         Ot            604.90            ORCHITIS/
EPIDIDYMIT NOS                     



                                                                



Procedures

      



There is no data.                  



Results

      





 Test            Result            Range        









 Complete blood count (CBC) with automated white blood cell (WBC) differential 
- 18 14:45         









 Blood leukocytes automated count (number/volume)            9.2 10*3/uL       
     4.3-11.0        

 

 Blood erythrocytes automated count (number/volume)            4.41 10*6/uL    
        4.35-5.85        

 

 Venous blood hemoglobin measurement (mass/volume)            13.8 g/dL        
    13.3-17.7        

 

 Blood hematocrit (volume fraction)            41 %            40-54        

 

 Automated erythrocyte mean corpuscular volume            94 [foz_us]          
  80-99        

 

 Automated erythrocyte mean corpuscular hemoglobin (mass per erythrocyte)      
      31 pg            25-34        

 

 Automated erythrocyte mean corpuscular hemoglobin concentration measurement (
mass/volume)            33 g/dL            32-36        

 

 Automated erythrocyte distribution width ratio            13.0 %            
10.0-14.5        

 

 Automated blood platelet count (count/volume)            292 10*3/uL          
  130-400        

 

 Automated blood platelet mean volume measurement            9.6 [foz_us]      
      7.4-10.4        

 

 Automated blood neutrophils/100 leukocytes            66 %            42-75   
     

 

 Automated blood lymphocytes/100 leukocytes            15 %            12-44   
     

 

 Blood monocytes/100 leukocytes            11 %            0-12        

 

 Automated blood eosinophils/100 leukocytes            8 %            0-10     
   

 

 Automated blood basophils/100 leukocytes            0 %            0-10        

 

 Blood neutrophils automated count (number/volume)            6.0 10*3         
   1.8-7.8        

 

 Blood lymphocytes automated count (number/volume)            1.4 10*3         
   1.0-4.0        

 

 Blood monocytes automated count (number/volume)            1.0 10*3            
0.0-1.0        

 

 Automated eosinophil count            0.7 10*3/uL            0.0-0.3        

 

 Automated blood basophil count (count/volume)            0.0 10*3/uL          
  0.0-0.1        









 Blood lactic acid measurement (moles/volume) - 18 14:45         









 Blood lactic acid measurement (moles/volume)            1.33 mmol/L            
0.50-2.00        









 PT panel in platelet poor plasma by coagulation assay - 18 14:45         









 Prothrombin time (PT) in platelet poor plasma by coagulation assay            
13.7 s            12.2-14.7        

 

 INR in platelet poor plasma or blood by coagulation assay            1.1      
       0.8-1.4        









 Activated partial thromboplastin time (aPTT) in platelet poor plasma 
bycoagulation assay - 18 14:45         









 Activated partial thromboplastin time (aPTT) in platelet poor plasma 
bycoagulation assay            34 s            24-35        









 Comprehensive metabolic panel - 18 14:45         









 Serum or plasma sodium measurement (moles/volume)            140 mmol/L       
     135-145        

 

 Serum or plasma potassium measurement (moles/volume)            3.9 mmol/L    
        3.6-5.0        

 

 Serum or plasma chloride measurement (moles/volume)            105 mmol/L     
               

 

 Carbon dioxide            23 mmol/L            21-32        

 

 Serum or plasma anion gap determination (moles/volume)            12 mmol/L   
         5-14        

 

 Serum or plasma urea nitrogen measurement (mass/volume)            13 mg/dL   
         7-18        

 

 Serum or plasma creatinine measurement (mass/volume)            0.85 mg/dL    
        0.60-1.30        

 

 Serum or plasma urea nitrogen/creatinine mass ratio            15             
NRG        

 

 Serum or plasma creatinine measurement with calculation of estimated 
glomerular filtration rate            >             NRG        

 

 Serum or plasma glucose measurement (mass/volume)            101 mg/dL        
            

 

 Serum or plasma calcium measurement (mass/volume)            9.6 mg/dL        
    8.5-10.1        

 

 Serum or plasma total bilirubin measurement (mass/volume)            0.5 mg/dL
            0.1-1.0        

 

 Serum or plasma alkaline phosphatase measurement (enzymatic activity/volume)  
          93 U/L                    

 

 Serum or plasma aspartate aminotransferase measurement (enzymatic activity/
volume)            24 U/L            5-34        

 

 Serum or plasma alanine aminotransferase measurement (enzymatic activity/volume
)            28 U/L            0-55        

 

 Serum or plasma protein measurement (mass/volume)            7.8 g/dL         
   6.4-8.2        

 

 Serum or plasma albumin measurement (mass/volume)            3.8 g/dL         
   3.2-4.5        

 

 CALCIUM CORRECTED            9.8 mg/dL            8.5-10.1        









 Bacterial blood culture - 18 14:45         









 Bacterial blood culture            NG             NRG        









 Bacterial blood culture - 18 14:52         









 Bacterial blood culture            NG             NRG        









 Bacterial blood culture - 18 15:08         









 Bacterial blood culture            NG             NRG        



                              



Encounters

      





 ACCT No.            Visit Date/Time            Discharge            Status    
        Pt. Type            Provider            Facility            Loc./Unit  
          Complaint        

 

 [id number]            2018 14:21:00            2018 17:59:00    
        DIS            Emergency            LUIS MIGUEL HARVEY            [facility]            ER            LEG INFECTION        

 

 [id number]            2014 18:35:00            2014 20:49:00    
        DIS            Emergency            CELSA MENDOZA MD            
[facility]            ER            DIZZINESS        

 

 [id number]            2014 19:54:00            2014 00:01:00    
        DIS            Outpatient            TAWIL MD, ELIAS A            [facility]            SURG RCR                     

 

 [id number]            2014 10:20:00            2014 17:00:00    
        DIS            Inpatient            TAWIL MD, ELIAS A            [facility]            SURGICAL            EPIDIDYORCHITIS    
    

 

 [id number]            2014 15:08:00            2014 23:59:59    
        CLS            Outpatient            MENDY HARE DO            
[facility]            RAD            RT. SWOLLEN TESTICLE
, PAIN        

 

 [id number]            2013 14:15:00            2013 15:15:00    
        DIS            Outpatient                                              
              

 

 [id number]            2013 08:16:00            2013 23:59:59    
        CLS            Outpatient                                              
              

 

 [id number]            2018 13:00:00                         PEN       
     Preadmit            EDNA HODGES MD            [facility]            CATH            NON HEALING PERIPHERAL ULCER,PVD,HTN      
  

 

 [id number]            2018 11:04:00                         ACT       
     Outpatient            EDNA HODGES MD            [facility]            CARD            AR        

 

 [id number]            2014 00:00:00                                   
   Document Registration

## 2018-11-14 NOTE — PERIPHERAL REPORT
Peripheral Report


Physician (s)/Assistant (s)


Physician


EDNA HODGES MD





Pre-Procedure Diagnosis


Pre-Procedure Diagnosis:  Peripheral arterial disease





Post-Procedure Note


Procedure Start Date:  Nov 14, 2018


Name of Procedure:  


Bilateral lower extremity runoff


Additional imaging with positioning of the catheter at the popliteal


artery on the right, third order


Additional imaging with positioning of the catheter on the ipsilateral


common iliac artery on the left, first order


Findings/Procedure Note


PROCEDURE NOTE:





After explaining the procedure to the patient, all pros and cons were explained

, all questions were answered.  The patient signed the consent and then he was 

placed on the cardiac catheterization laboratory.





The patient was placed on the cardiac catheterization laboratory. Groin was 

prepped SL fashion local anesthesia was used. Sheath placed in the left femoral 

artery.


Using UF catheter I was able to cross over then placed the UF catheter at the 

right common iliac artery and did runoff to the right lower extremity, then 

advanced this straight catheter to the mid right SFA and did runoff then 

advanced it again to the distal popliteal and tibioperoneal trunk and did 2 

separate DSA imaging of the foot and the trifurcation then pulled the straight 

catheter back to the ipsilateral left common iliac artery and did runoff to the 

left lower extremity.


At the end of the procedure catheter was removed, sheath was removed and minx 

device deployed





FINDINGS:


Left lower extremity, done with runoffs through the straight catheter at the 

left common iliac artery which showed mild disease at the SFA and down to the 

trifurcation with good flow


Right lower extremity, mild disease down to the trifurcation, the anterior 

tibial artery is moderate in size with severe stenosis distally, the posterior 

tibial artery is fairly small artery and occluded, the peroneal artery is 

fairly small artery with diffuse disease but reaching down to the foot.  The 

anterior tibial artery is giving collateral to the posterior tibial


 


CONCLUSIONS:


1.  Severe peripheral arterial disease below the trifurcation on the right 

lower extremity involving total occlusion of the posterior tibial artery and 

severe disease at the peroneal artery, the anterior tibial artery has a single 

lesion at the mid to distal portion, I recommend conservative management unless 

the patient had foot ulcer down at the ankle level


2.  On the left side there is mild disease down to the trifurcation with good 

flow below the trifurcation





DISCUSSION AND RECOMMENDATIONS:


Medical therapy is recommended no intervention is needed at this time, if 

patient developed an ulceration at the level of the ankle or below I will 

Consider intervention


Anesthesia Type:  Conscious Sedation


Estimated blood loss (mL):  20 ml


Contrast Amount:  30 ml


Total Radiation Dose:  126 mGy





Post-Procedure Diagnosis


Post-operative diagnosis:  


Peripheral arterial disease


Hypertension


Hyperlipidemia


Cellulitis











EDNA HODGES MD Nov 14, 2018 14:33

## 2018-11-14 NOTE — CARDIAC PROCEDURE NOTE-CS/ASA
Pre-Procedure Note


Pre-Op Procedure Note


H&P Reviewed


The H&P was reviewed, patient examined and no changes noted.


Date H&P Reviewed:  Nov 14, 2018


Time H&P Reviewed:  13:59





Conscious Sedation Pre-Proced


Time


13:59





ASA Score


3


For ASA 3 and 4: Consider anesthesia and medical clearance. Also, for 

patients with a history of failed moderate sedation consider anesthesia.

















Airway 


 


Lungs 


 


Heart 


 


 ASA score


 


 ASA 1: a normal healthy patient


 


 ASA 2:  a patient with a mild systemic disease (mid diabetes, controlled 

hypertension, obesity 


 


x ASA 3:  a patient with a severe systemic disease that limits activity  (angina

, COPD, prior Myocardial infarction)


 


 ASA 4:  a patient with an incapacitating disease that is a constant threat to 

life (CHF, renal failure)


 


 ASA 5:  a moribund patient not expected to survive 24 hrs.  (ruptured aneurysm)


 


 ASA 6:  a declared brain dead patient whose organs are being harvested.


 


 For emergent operations, add the letter E after the classification











Mallampati Classification


Grade 3





Sedation Plan


Analgesia, Amnesia, Plan communicated to team members, Discussed options with 

patient/fam, Discussed risks with patient/fam


The patient is an appropriate candidate to undergo the planned procedure, 

sedation, and anesthesia.





The patient immediately re-assessed prior to indication.











EDNA HODGES MD Nov 14, 2018 13:59

## 2018-11-14 NOTE — DIAGNOSTIC IMAGING REPORT
INDICATION: Peripheral vascular disease and chest pain.



Frontal chest obtained at 11:16 a.m. and is compared to

06/23/2014.



FINDINGS: There is cardiomegaly. Mediastinal silhouette is

unremarkable. There is some linear scarring or atelectasis in the

left infrahilar region, which is unchanged compared to the prior

study. There is no consolidation or pleural fluid.



IMPRESSION:



Cardiomegaly. There is some linear scarring or atelectasis in the

left infrahilar region, which appears unchanged compared to the

prior study. There is no new abnormality.



Dictated by: 



  Dictated on workstation # [id number]

## 2019-07-02 ENCOUNTER — HOSPITAL ENCOUNTER (OUTPATIENT)
Dept: HOSPITAL 75 - WOUNDCARE | Age: 69
End: 2019-07-02
Attending: NURSE PRACTITIONER
Payer: MEDICARE

## 2019-07-02 DIAGNOSIS — I87.323: Primary | ICD-10-CM

## 2019-07-02 PROCEDURE — 99213 OFFICE O/P EST LOW 20 MIN: CPT

## 2019-08-19 ENCOUNTER — HOSPITAL ENCOUNTER (OUTPATIENT)
Dept: HOSPITAL 75 - CARD | Age: 69
End: 2019-08-19
Attending: INTERNAL MEDICINE
Payer: MEDICARE

## 2019-08-19 DIAGNOSIS — I10: ICD-10-CM

## 2019-08-19 DIAGNOSIS — I25.10: ICD-10-CM

## 2019-08-19 DIAGNOSIS — E78.2: ICD-10-CM

## 2019-08-19 DIAGNOSIS — I08.3: Primary | ICD-10-CM

## 2019-08-19 PROCEDURE — 93306 TTE W/DOPPLER COMPLETE: CPT

## 2019-08-28 ENCOUNTER — HOSPITAL ENCOUNTER (OUTPATIENT)
Dept: HOSPITAL 75 - CARD | Age: 69
End: 2019-08-28
Attending: INTERNAL MEDICINE
Payer: MEDICARE

## 2019-08-28 VITALS — SYSTOLIC BLOOD PRESSURE: 155 MMHG | DIASTOLIC BLOOD PRESSURE: 82 MMHG

## 2019-08-28 DIAGNOSIS — I25.10: ICD-10-CM

## 2019-08-28 DIAGNOSIS — E78.2: ICD-10-CM

## 2019-08-28 DIAGNOSIS — I08.0: Primary | ICD-10-CM

## 2019-08-28 DIAGNOSIS — I10: ICD-10-CM

## 2019-08-28 PROCEDURE — 93017 CV STRESS TEST TRACING ONLY: CPT

## 2019-08-28 PROCEDURE — 78452 HT MUSCLE IMAGE SPECT MULT: CPT

## 2019-08-28 NOTE — STRESS TEST
DATE OF SERVICE:  08/28/2019



LEXISCAN MYOVIEW STRESS TEST REPORT



REFERRING PHYSICIAN:

Hugo Alexander DO



 Baseline heart rate is 57, baseline blood pressure 160/77.  Baseline EKG is

sinus rhythm with no ischemic changes.



In summary, the patient was injected with 10.21 mCi of technetium-99 Myoview and

the resting images were obtained.  Then, the patient received 0.4 mg of Lexiscan

followed by 30.0 mCi of technetium-99 Myoview.  Throughout the test, there were

no EKG changes.



The resting and stress images were reviewed and compared in the short axis,

horizontal long axis, and vertical long axis views.  Review of the images showed

diaphragmatic attenuation with typical male pattern, mild decreased uptake

involving the inferoapical segment with mild reversibility, no significant

ischemia or infarction.  SSS is 4, SDS is 4, TID value 1.04.  On the gated

images, the left ventricle appeared to be normal size with normal contractility.

 Calculated ejection fraction 69%.



CONCLUSION:

1.  The patient tolerated Lexiscan well.

2.  Diaphragmatic attenuation with typical male pattern with no significant

ischemia or infarction on SPECT images.

3.  Normal left ventricular size with normal contractility.  Calculated ejection

fraction 69%.





Job ID: [id number]

DocumentID: [id number]

Dictated Date:  08/28/2019 15:52:32

Transcription Date: 08/28/2019 18:52:21

Dictated By: EDNA HODGES MD

## 2020-02-04 ENCOUNTER — HOSPITAL ENCOUNTER (OUTPATIENT)
Dept: HOSPITAL 75 - WOUNDCARE | Age: 70
End: 2020-02-04
Attending: ORTHOPAEDIC SURGERY
Payer: MEDICARE

## 2020-02-04 DIAGNOSIS — L03.115: ICD-10-CM

## 2020-02-04 DIAGNOSIS — I89.0: ICD-10-CM

## 2020-02-04 DIAGNOSIS — I87.323: Primary | ICD-10-CM

## 2020-02-04 DIAGNOSIS — I73.9: ICD-10-CM

## 2020-02-04 PROCEDURE — 99213 OFFICE O/P EST LOW 20 MIN: CPT

## 2020-06-10 ENCOUNTER — HOSPITAL ENCOUNTER (OUTPATIENT)
Dept: HOSPITAL 75 - CATH | Age: 70
Discharge: HOME | End: 2020-06-10
Attending: INTERNAL MEDICINE
Payer: MEDICARE

## 2020-06-10 VITALS — DIASTOLIC BLOOD PRESSURE: 76 MMHG | SYSTOLIC BLOOD PRESSURE: 162 MMHG

## 2020-06-10 VITALS — SYSTOLIC BLOOD PRESSURE: 127 MMHG | DIASTOLIC BLOOD PRESSURE: 63 MMHG

## 2020-06-10 VITALS — HEIGHT: 65.98 IN | WEIGHT: 164.91 LBS | BODY MASS INDEX: 26.5 KG/M2

## 2020-06-10 VITALS — SYSTOLIC BLOOD PRESSURE: 145 MMHG | DIASTOLIC BLOOD PRESSURE: 60 MMHG

## 2020-06-10 VITALS — DIASTOLIC BLOOD PRESSURE: 54 MMHG | SYSTOLIC BLOOD PRESSURE: 146 MMHG

## 2020-06-10 VITALS — DIASTOLIC BLOOD PRESSURE: 57 MMHG | SYSTOLIC BLOOD PRESSURE: 134 MMHG

## 2020-06-10 VITALS — DIASTOLIC BLOOD PRESSURE: 58 MMHG | SYSTOLIC BLOOD PRESSURE: 117 MMHG

## 2020-06-10 VITALS — SYSTOLIC BLOOD PRESSURE: 126 MMHG | DIASTOLIC BLOOD PRESSURE: 67 MMHG

## 2020-06-10 VITALS — DIASTOLIC BLOOD PRESSURE: 75 MMHG | SYSTOLIC BLOOD PRESSURE: 160 MMHG

## 2020-06-10 VITALS — SYSTOLIC BLOOD PRESSURE: 122 MMHG | DIASTOLIC BLOOD PRESSURE: 58 MMHG

## 2020-06-10 VITALS — DIASTOLIC BLOOD PRESSURE: 64 MMHG | SYSTOLIC BLOOD PRESSURE: 138 MMHG

## 2020-06-10 DIAGNOSIS — I73.9: Primary | ICD-10-CM

## 2020-06-10 DIAGNOSIS — Z79.899: ICD-10-CM

## 2020-06-10 DIAGNOSIS — I08.3: ICD-10-CM

## 2020-06-10 DIAGNOSIS — I65.23: ICD-10-CM

## 2020-06-10 DIAGNOSIS — Z87.891: ICD-10-CM

## 2020-06-10 DIAGNOSIS — I25.10: ICD-10-CM

## 2020-06-10 DIAGNOSIS — Z79.82: ICD-10-CM

## 2020-06-10 DIAGNOSIS — E78.2: ICD-10-CM

## 2020-06-10 DIAGNOSIS — I87.2: ICD-10-CM

## 2020-06-10 DIAGNOSIS — Z79.02: ICD-10-CM

## 2020-06-10 DIAGNOSIS — I10: ICD-10-CM

## 2020-06-10 LAB
ALBUMIN SERPL-MCNC: 3.8 GM/DL (ref 3.2–4.5)
ALP SERPL-CCNC: 72 U/L (ref 40–136)
ALT SERPL-CCNC: 15 U/L (ref 0–55)
BILIRUB SERPL-MCNC: 0.8 MG/DL (ref 0.1–1)
BUN/CREAT SERPL: 20
CALCIUM SERPL-MCNC: 9.2 MG/DL (ref 8.5–10.1)
CHLORIDE SERPL-SCNC: 108 MMOL/L (ref 98–107)
CHOLEST SERPL-MCNC: 148 MG/DL (ref ?–200)
CO2 SERPL-SCNC: 20 MMOL/L (ref 21–32)
CREAT SERPL-MCNC: 1.19 MG/DL (ref 0.6–1.3)
ERYTHROCYTE [DISTWIDTH] IN BLOOD BY AUTOMATED COUNT: 12.6 % (ref 10–14.5)
GFR SERPLBLD BASED ON 1.73 SQ M-ARVRAT: > 60 ML/MIN
GLUCOSE SERPL-MCNC: 86 MG/DL (ref 70–105)
HCT VFR BLD CALC: 41 % (ref 40–54)
HDLC SERPL-MCNC: 39 MG/DL (ref 40–60)
HGB BLD-MCNC: 13.6 G/DL (ref 13.3–17.7)
INR PPP: 1.1 (ref 0.8–1.4)
MCH RBC QN AUTO: 32 PG (ref 25–34)
MCHC RBC AUTO-ENTMCNC: 33 G/DL (ref 32–36)
MCV RBC AUTO: 96 FL (ref 80–99)
PLATELET # BLD: 192 10^3/UL (ref 130–400)
PMV BLD AUTO: 9.7 FL (ref 7.4–10.4)
POTASSIUM SERPL-SCNC: 4.4 MMOL/L (ref 3.6–5)
PROT SERPL-MCNC: 7.6 GM/DL (ref 6.4–8.2)
PROTHROMBIN TIME: 14.5 SEC (ref 12.2–14.7)
SODIUM SERPL-SCNC: 140 MMOL/L (ref 135–145)
TRIGL SERPL-MCNC: 87 MG/DL (ref ?–150)
VLDLC SERPL CALC-MCNC: 17 MG/DL (ref 5–40)
WBC # BLD AUTO: 5.7 10^3/UL (ref 4.3–11)

## 2020-06-10 PROCEDURE — 80053 COMPREHEN METABOLIC PANEL: CPT

## 2020-06-10 PROCEDURE — 75716 ARTERY X-RAYS ARMS/LEGS: CPT

## 2020-06-10 PROCEDURE — 85027 COMPLETE CBC AUTOMATED: CPT

## 2020-06-10 PROCEDURE — 36415 COLL VENOUS BLD VENIPUNCTURE: CPT

## 2020-06-10 PROCEDURE — 93005 ELECTROCARDIOGRAM TRACING: CPT

## 2020-06-10 PROCEDURE — 87081 CULTURE SCREEN ONLY: CPT

## 2020-06-10 PROCEDURE — 85610 PROTHROMBIN TIME: CPT

## 2020-06-10 PROCEDURE — 36248 INS CATH ABD/L-EXT ART ADDL: CPT

## 2020-06-10 PROCEDURE — 71045 X-RAY EXAM CHEST 1 VIEW: CPT

## 2020-06-10 PROCEDURE — 80061 LIPID PANEL: CPT

## 2020-06-10 NOTE — CARDIAC PROCEDURE NOTE-CS/ASA
Pre-Procedure Note


Pre-Op Procedure Note


H&P Reviewed


The H&P was reviewed, patient examined and no changes noted.


Date H&P Reviewed:  Omar 10, 2020


Time H&P Reviewed:  13:30





Conscious Sedation Pre-Proced


Time


13:30





ASA Score


3


For ASA 3 and 4: Consider anesthesia and medical clearance. Also, for patients

with a history of failed moderate sedation consider anesthesia.

















Airway 


 


Lungs 


 


Heart 


 


 ASA score


 


 ASA 1: a normal healthy patient


 


 ASA 2:  a patient with a mild systemic disease (mid diabetes, controlled 

hypertension, obesity 


 


x ASA 3:  a patient with a severe systemic disease that limits activity  

(angina, COPD, prior Myocardial infarction)


 


 ASA 4:  a patient with an incapacitating disease that is a constant threat to 

life (CHF, renal failure)


 


 ASA 5:  a moribund patient not expected to survive 24 hrs.  (ruptured aneurysm)


 


 ASA 6:  a declared brain-dead patient whose organs are being harvested.


 


 For emergent operations, add the letter E after the classification











Mallampati Classification


Grade 3





Sedation Plan


Analgesia, Amnesia, Plan communicated to team members, Discussed options with 

patient/fam, Discussed risks with patient/fam


The patient is an appropriate candidate to undergo the planned procedure, 

sedation, and anesthesia.





The patient immediately re-assessed prior to indication.











EDNA HODGES MD              Omar 10, 2020 13:30

## 2020-06-10 NOTE — DISCHARGE INST-POST CATH
Discharge Inst-CATH/EP


Problems Reviewed?:  Yes


Post Cardiac Cath/EP D/C Inst


Follow Up/Plan


Appointment with Dr Lyons's office in 2-4 weeks


<b>CARDIAC CATH/EP PROCEDURE DISCHARGE INSTRUCTIONS</b>





ACTIVITY





* Go Home directly and rest.


* Limit activity of the leg (or wrist if it was used) for 7 days including 

aerobics, swimming,


   jogging, bicycling, etc.


* Restrict stair-climbing for 7 days if possible, if not, climb up with your 

non-cath leg, then


   bring together on the same step.


* Avoid lifting, pushing, pulling or excessive movement of the affected 

extremity for 7 days.


* Customary sexual activity may be resumed after 2 days-use caution not to use a

position  


   that strains or causes pain to the affected extremity.


* No driving for 24 hours.


* NO SMOKING. 


* Avoid straining for bowel movements for 7 days.


* Gentle walking on level ground is allowed.


* Returning to work will depend on the type of procedure and the results. Your 

doctor will discuss


   this with you.





CALL YOUR DOCTOR FOR ANY OF THE FOLLOWING:





*If bleeding from the puncture site occurs- Apply gentle pressure to site with 

clean cloth and call


   your doctor or EMS.


* If a knot or lump forms under the skin, increases in size, or causes pain.


* If bruising appears to be worsening or moving further down your leg instead of

disappearing.


* Temperature above 101 F.





CARE OF YOUR GROIN INCISION;





* Bruising or purple discoloration of the skin near the puncture site is common.


* You may shower only, no bathtub bathing for 5 days.  Be careful to avoid 

slipping as your


   leg may feel stiff.


* If a closure device was used on your femoral artery, please see the attached 

guide regarding


   care of the device and your leg.


* Leave dressing on FOR 24 hours.





CARE OF YOUR WRIST INCISION;





* Bruising or purple discoloration of the skin near the puncture site is common.


* You may shower.


* DO NOT submerge wrist.


* Leave dressing on FOR 24 hours.











EDNA LYONS MD              Omar 10, 2020 13:32

## 2020-06-10 NOTE — XMS REPORT
Continuity of Care Document

                             Created on: 06/10/2020



NATY ARENAS

External Reference #: [id number]

: 1950

Sex: Male



Demographics





                          Address                   [address] [address]

 

                          Home Phone                [phone] x

 

                          Preferred Language        Unknown

 

                          Marital Status            Unknown

 

                          Jain Affiliation     Unknown

 

                          Race                      Unknown

 

                          Ethnic Group              Unknown





Author





                          Organization              Unknown

 

                          Address                   Unknown

 

                          Phone                     Unavailable



              



Allergies

      



             Active           Description           Code           Type         

  Severity   

                Reaction           Onset           Reported/Identified          

 

Relationship to Patient                 Clinical Status        

 

                Yes             No Known Drug Allergies           [id number]    

       Drug 

Allergy           Unknown           N/A                             2012  

      

                                                             



                  



Medications

      



There is no data.                  



Problems

      



             Date Dx Coded           Attending           Type           Code    

       

Diagnosis                               Diagnosed By        

 

           2006                       Ot           739.3                  

           

  

 

           2006                       Ot           959.19                 

           

   

 

           2006                       Ot           E849.3                 

           

   

 

           2006                       Ot           E927                   

           

 

 

           2006                       Ot           V57.1                  

           

  

 

             2012                        Ot           427.89           CAR

DIAC 

DYSRHYTHMIAS NEC                                 

 

             2012                        Ot           785.1           PALP

ITATIONS      

                                                 

 

             2012                        Ot           401.9           HYPE

RTENSION NOS  

                                                 

 

             2012                        Ot           410.71           AC 

MYOCARDIAL 

INFARCT,SUBENDO INFARCT,IN                       

 

             2012                        Ot           414.01           COR

ONARY 

ATHEROSCLEROSIS OF NATIVE CORON                    

 

             2013                        Ot           401.9           HYPE

RTENSION NOS  

                                                 

 

             2013                        Ot           786.05           SO

RTNESS OF 

BREATH                                           

 

                2013           MENDY HARE DO           Ot       

       272.4       

                          HYPERLIPIDEMIA NEC/NOS                    

 

                2013           MENDY HARE DO           Ot       

       401.9       

                          HYPERTENSION NOS                    

 

                2013           MENDY HARE DO           Ot       

       414.01      

                          CORONARY ATHEROSCLEROSIS OF NATIVE CORON              

      

 

                2013           MENDY HARE DO           Ot       

       496         

                          CHR AIRWAY OBSTRUCT NEC                    

 

                2013           MENDY HARE DO           Ot       

       530.81      

                          ESOPHAGEAL REFLUX                    

 

                2013           MENDY HARE DO           Ot       

       535.10      

                          ATROPHIC GASTRITIS, WITHOUT MENTION OF H              

      

 

                2013           MENDY HARE DO           Ot       

       593.9       

                          RENAL   URETERAL DIS NOS                    

 

                2013           MENDY HARE DO           Ot       

       786.59      

                          CHEST PAIN NEC                     

 

                2013           MENDY HARE DO           Ot       

       V58.66      

                          LONG-TERM (CURRENT) USE OF ASPIRIN                    

 

                2013           MENDY HARE DO           Ot       

       V58.69      

                          OTH MED,LT,CURRENT USE                    

 

             2014           TAWIL MD, ELIAS A           Ot           272.0

           

PURE HYPERCHOLESTEROLEM                          

 

             2014           TAWIL MD, ELIAS A           Ot           401.9

           

HYPERTENSION NOS                                 

 

             2014           TAWIL MD, ESPINOZA VENTURA           Ot           530.8

1           

ESOPHAGEAL REFLUX                                

 

             2014           TAWIL MD, ELIAS A           Ot           600.0

0           

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                    

 

             2014           TAWIL MD, ELIAS A           Ot           603.9

           

HYDROCELE NOS                                    

 

             2014           TAWIL MD, ESPINOZA VENTURA           Ot           604.9

0           

ORCHITIS/EPIDIDYMIT NOS                          

 

             2014           TAWIL MD, ESPINOZA VENTURA           Ot           607.8

4           

IMPOTENCE, ORGANIC ORIGN                         

 

             2014           TAWIL MD, ELIAS A           Ot           604.9

0            

                                                 

 

                2014           KELLY RUIZ, CELSA MENDIETA           Ot      

        780.4      

                          DIZZINESS AND GIDDINESS                    

 

                2014           KELLY RUIZ, CELSA MENDIETA           Ot      

        782.3      

                          EDEMA                              

 

                2018           PAYAM MENDOZA MENDY AUDREY           Ot       

       603.9       

                          HYDROCELE NOS                      

 

             2018                        Ot           604.90           

ORCHITIS/EPIDIDYMIT NOS                          

 

             2018           LUIS MIGUEL HARVEY           Ot           I73.9   

        

PERIPHERAL VASCULAR DISEASE, UNSPECIFIED                    

 

             2018           LUIS MIGUEL HARVEY           Ot           L03.115 

          

CELLULITIS OF RIGHT LOWER LIMB                    

 

             2018           LUIS MIGUEL HARVEY           Ot           M79.604 

          

PAIN IN RIGHT LEG                                

 

             2018           LUIS MIGUEL HARVEY           Ot           Z86.19  

         

PERSONAL HISTORY OF OTHER INFECTIOUS AND                    

 

             2018           PEDRO HARVEYIS           Ot           Z87.448 

          

PERSONAL HISTORY OF OTHER DISEASES OF UR                    

 

             2018           LUIS MIGUEL HARVEY           Ot           I73.9   

        

PERIPHERAL VASCULAR DISEASE, UNSPECIFIED                    

 

             2018           LUIS MIGUEL HARVEY           Ot           L03.115 

          

CELLULITIS OF RIGHT LOWER LIMB                    

 

             2018           PEDRO HARVEYIS           Ot           M79.604 

          

PAIN IN RIGHT LEG                                

 

             2018           LUIS MIGUEL HARVEY           Ot           Z86.19  

         

PERSONAL HISTORY OF OTHER INFECTIOUS AND                    

 

             2018           PEDRO HARVEYIS           Ot           Z87.448 

          

PERSONAL HISTORY OF OTHER DISEASES OF UR                    

 

                2018           MENDY HARE DO A           Ot       

       603.9       

                          HYDROCELE NOS                      

 

             2018                        Ot           604.90           

ORCHITIS/EPIDIDYMIT NOS                          

 

             2018           EDNA HODGES MD           Ot           E78.

5           

HYPERLIPIDEMIA, UNSPECIFIED                      

 

             2018           EDNA HODGES MD           Ot           I08.

3           

COMB RHEUMATIC DISORD OF MITRAL, AORTIC                     

 

             2018           EDNA HODGES MD           Ot           I10 

          

ESSENTIAL (PRIMARY) HYPERTENSION                    

 

             2018           EDNA HODGES MD           Ot           I25.

10           

ATHSCL HEART DISEASE OF NATIVE CORONARY                     

 

                2018           EDNA HODGES MD           Ot            

  I70.201          

                          UNSP ATHSCL NATIVE ARTERIES OF EXTREMITI              

      

 

                2018           EDNA HODGES MD           Ot            

  L03.115          

                          CELLULITIS OF RIGHT LOWER LIMB                    

 

             2018           EDNA HODGES MD           Ot           R06.

09           

OTHER FORMS OF DYSPNEA                           

 

                2018           EDNA HODGES MD           Ot            

  Z79.899          

                          OTHER LONG TERM (CURRENT) DRUG THERAPY                

    

 

             2018           EDNA HODGES MD           Ot           I08.

0           

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                    

 

             2018           EDNA HODGES MD           Ot           I25.

10           

ATHSCL HEART DISEASE OF NATIVE CORONARY                     

 

             2018           EDNA HODGES MD           Ot           I73.

9           

PERIPHERAL VASCULAR DISEASE, UNSPECIFIED                    

 

             2018           EDNA HODGES MD           Ot           I08.

0           

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                    

 

             2018           EDNA HODGES MD           Ot           I25.

10           

ATHSCL HEART DISEASE OF NATIVE CORONARY                     

 

             2018           EDNA HODGES MD           Ot           I73.

9           

PERIPHERAL VASCULAR DISEASE, UNSPECIFIED                    

 

             2018           EDNA HODGES MD           Ot           E78.

5           

HYPERLIPIDEMIA, UNSPECIFIED                      

 

             2018           EDNA HODGES MD           Ot           I08.

3           

COMB RHEUMATIC DISORD OF MITRAL, AORTIC                     

 

             2018           EDNA HODGES MD           Ot           I10 

          

ESSENTIAL (PRIMARY) HYPERTENSION                    

 

             2018           EDNA HODGES MD           Ot           I25.

10           

ATHSCL HEART DISEASE OF NATIVE CORONARY                     

 

                2018           EDNA HODGES MD           Ot            

  I70.201          

                          UNSP ATHSCL NATIVE ARTERIES OF EXTREMITI              

      

 

                2018           EDNA HODGES MD           Ot            

  L03.115          

                          CELLULITIS OF RIGHT LOWER LIMB                    

 

             2018           EDNA HODGES MD           Ot           R06.

09           

OTHER FORMS OF DYSPNEA                           

 

                2018           EDNA HODGES MD           Ot            

  Z79.899          

                          OTHER LONG TERM (CURRENT) DRUG THERAPY                

    

 

             2018           EDNA HODGES MD           Ot           I08.

0           

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                    

 

             2018           EDNA HODGES MD           Ot           I25.

10           

ATHSCL HEART DISEASE OF NATIVE CORONARY                     

 

             2018           EDNA HODGES MD           Ot           I73.

9           

PERIPHERAL VASCULAR DISEASE, UNSPECIFIED                    

 

                2019           PAYAM DO MENDY A           Ot       

       603.9       

                          HYDROCELE NOS                      

 

             2019                        Ot           604.90           

ORCHITIS/EPIDIDYMIT NOS                          

 

             2019           EDNA HODGES MD           Ot           I08.

0           

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                    

 

             2019           EDNA HODGES MD           Ot           I25.

10           

ATHSCL HEART DISEASE OF NATIVE CORONARY                     

 

             2019           EDNA HODGES MD           Ot           I73.

9           

PERIPHERAL VASCULAR DISEASE, UNSPECIFIED                    

 

                2019           LIYA WING           Ot         

     I87.323       

                          CHRONIC VENOUS HTN W INFLAMMATION OF BRIANDA              

      

 

                2019           PAYAM , MENDY A           Ot       

       603.9       

                          HYDROCELE NOS                      

 

             2019                        Ot           604.90           

ORCHITIS/EPIDIDYMIT NOS                          

 

                2019           LIYA WING           Ot         

     I87.323       

                          CHRONIC VENOUS HTN W INFLAMMATION OF BRIANDA              

      

 

             2019           EDNA HODGES MD           Ot           E78.

2           

MIXED HYPERLIPIDEMIA                             

 

             2019           EDNA HODGES MD           Ot           I08.

3           

COMB RHEUMATIC DISORD OF MITRAL, AORTIC                     

 

             2019           EDNA HODGES MD           Ot           I10 

          

ESSENTIAL (PRIMARY) HYPERTENSION                    

 

             2019           EDNA HODGES MD           Ot           I25.

10           

ATHSCL HEART DISEASE OF NATIVE CORONARY                     

 

             2019           EDNA HODGES MD           Ot           E78.

2           

MIXED HYPERLIPIDEMIA                             

 

             2019           EDNA HODGES MD           Ot           I08.

3           

COMB RHEUMATIC DISORD OF MITRAL, AORTIC                     

 

             2019           EDNA HODGES MD           Ot           I10 

          

ESSENTIAL (PRIMARY) HYPERTENSION                    

 

             2019           EDNA HODGES MD           Ot           I25.

10           

ATHSCL HEART DISEASE OF NATIVE CORONARY                     

 

             2019           EDNA HODGES MD           Ot           E78.

2           

MIXED HYPERLIPIDEMIA                             

 

             2019           CARROLL MD, BASHAR J           Ot           I08.

3           

COMB RHEUMATIC DISORD OF MITRAL, AORTIC                     

 

             2019           EDNA HODGES MD J           Ot           I10 

          

ESSENTIAL (PRIMARY) HYPERTENSION                    

 

             2019           EDNA HODGES MD           Ot           I25.

10           

ATHSCL HEART DISEASE OF NATIVE CORONARY                     

 

             2019           EDNA HODGES MD           Ot           E78.

2           

MIXED HYPERLIPIDEMIA                             

 

             2019           EDNA HODGES MD           Ot           I08.

0           

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                    

 

             2019           EDNA HODGES MD J           Ot           I10 

          

ESSENTIAL (PRIMARY) HYPERTENSION                    

 

             2019           EDNA HODGES MD           Ot           I25.

10           

ATHSCL HEART DISEASE OF NATIVE CORONARY                     

 

             2019           EDNA HODGES MD           Ot           E78.

2           

MIXED HYPERLIPIDEMIA                             

 

             2019           EDNA HODGES MD           Ot           I08.

3           

COMB RHEUMATIC DISORD OF MITRAL, AORTIC                     

 

             2019           EDNA HODGES MD J           Ot           I10 

          

ESSENTIAL (PRIMARY) HYPERTENSION                    

 

             2019           EDNA HODGES MD           Ot           I25.

10           

ATHSCL HEART DISEASE OF NATIVE CORONARY                     

 

             2019           EDNA HODGES MD           Ot           E78.

2           

MIXED HYPERLIPIDEMIA                             

 

             2019           EDNA HODGES MD           Ot           I08.

0           

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                    

 

             2019           EDNA HODGES MD           Ot           I10 

          

ESSENTIAL (PRIMARY) HYPERTENSION                    

 

             2019           EDNA HODGES MD           Ot           I25.

10           

ATHSCL HEART DISEASE OF NATIVE CORONARY                     

 

             2020           KYREE EMERY MD           Ot           I87.323  

         

CHRONIC VENOUS HTN W INFLAMMATION OF BRIANDA                    

 

             2020           KYREE EMERY MD           Ot           I73.9    

       

PERIPHERAL VASCULAR DISEASE, UNSPECIFIED                    

 

             2020           KYREE EMERY MD           Ot           I87.323  

         

CHRONIC VENOUS HTN W INFLAMMATION OF BRIANDA                    

 

             2020           KYREE EMERY MD           Ot           I89.0    

       

LYMPHEDEMA, NOT ELSEWHERE CLASSIFIED                    

 

             2020           KYREE EMERY MD           Ot           L03.115  

         

CELLULITIS OF RIGHT LOWER LIMB                    

 

             2020           KYREE EMERY MD           Ot           I73.9    

       

PERIPHERAL VASCULAR DISEASE, UNSPECIFIED                    

 

             2020           KYREE EMERY MD           Ot           I87.323  

         

CHRONIC VENOUS HTN W INFLAMMATION OF BRIANDA                    

 

             2020           KYREE EMERY MD           Ot           I89.0    

       

LYMPHEDEMA, NOT ELSEWHERE CLASSIFIED                    

 

             2020           KYREE EMERY MD           Ot           L03.115  

         

CELLULITIS OF RIGHT LOWER LIMB                    



                                                                                
                                                                                
                                                                                



Procedures

      



                Code            Description           Performed By           Per

formed On        

 

                                      37.22                                 LEFT

 HEART CARDIAC CATH       

                                                    2012        

 

                                      88.53                                 [facility]

EART ANGIOCARDIOGRAM      

                                                    2012        

 

                                      88.56                                 [facility]

JOEL ARTERIOGR-2 CATH      

                                                    2012        



                      



Results

      



                    Test                Result              Range        

 

                                        Complete blood count (CBC) with automate

d white blood cell (WBC) differential - 

18 14:45         

 

                          Blood leukocytes automated count (number/volume)      

     9.2 10*3/uL          

                                        4.3-11.0        

 

                          Blood erythrocytes automated count (number/volume)    

       4.41 10*6/uL       

                                        4.35-5.85        

 

                    Venous blood hemoglobin measurement (mass/volume)           

13.8 g/dL           

13.3-17.7        

 

                    Blood hematocrit (volume fraction)           41 %           

     40-54        

 

                    Automated erythrocyte mean corpuscular volume           94 [

foz_us]           

80-99        

 

                                        Automated erythrocyte mean corpuscular h

emoglobin (mass per erythrocyte)        

                          31 pg                     25-34        

 

                                        Automated erythrocyte mean corpuscular h

emoglobin concentration measurement 

(mass/volume)             33 g/dL                   32-36        

 

                    Automated erythrocyte distribution width ratio           13.

0 %              10.0-

14.5        

 

                    Automated blood platelet count (count/volume)           292 

10*3/uL           

130-400        

 

                          Automated blood platelet mean volume measurement      

     9.6 [foz_us]         

                                        7.4-10.4        

 

                    Automated blood neutrophils/100 leukocytes           66 %   

             42-75       

 

 

                    Automated blood lymphocytes/100 leukocytes           15 %   

             12-44       

 

 

                    Blood monocytes/100 leukocytes           11 %               

 0-12        

 

                    Automated blood eosinophils/100 leukocytes           8 %    

             0-10        

 

                    Automated blood basophils/100 leukocytes           0 %      

           0-10        

 

                    Blood neutrophils automated count (number/volume)           

6.0 10*3            

1.8-7.8        

 

                    Blood lymphocytes automated count (number/volume)           

1.4 10*3            

1.0-4.0        

 

                    Blood monocytes automated count (number/volume)           1.

0 10*3            

0.0-1.0        

 

                    Automated eosinophil count           0.7 10*3/uL           0

.0-0.3        

 

                    Automated blood basophil count (count/volume)           0.0 

10*3/uL           

0.0-0.1        

 

                                        Blood lactic acid measurement (moles/vol

ume) - 18 14:45         

 

                    Blood lactic acid measurement (moles/volume)           1.33 

mmol/L           

0.50-2.00        

 

                                        PT panel in platelet poor plasma by coag

ulation assay - 18 14:45         

 

                          Prothrombin time (PT) in platelet poor plasma by coagu

lation assay           

13.7 s                                  12.2-14.7        

 

                          INR in platelet poor plasma or blood by coagulation as

say           1.1         

                                        0.8-1.4        

 

                                        Activated partial thromboplastin time (a

PTT) in platelet poor plasma 

bycoagulation assay - 18 14:45         

 

                                        Activated partial thromboplastin time (a

PTT) in platelet poor plasma 

bycoagulation assay           34 s                      24-35        

 

                                        Comprehensive metabolic panel - 18

 14:45         

 

                          Serum or plasma sodium measurement (moles/volume)     

      140 mmol/L          

                                        135-145        

 

                          Serum or plasma potassium measurement (moles/volume)  

         3.9 mmol/L       

                                        3.6-5.0        

 

                          Serum or plasma chloride measurement (moles/volume)   

        105 mmol/L        

                                                

 

                    Carbon dioxide           23 mmol/L           21-32        

 

                          Serum or plasma anion gap determination (moles/volume)

           12 mmol/L      

                                        5-14        

 

                          Serum or plasma urea nitrogen measurement (mass/volume

)           13 mg/dL      

                                        7-18        

 

                          Serum or plasma creatinine measurement (mass/volume)  

         0.85 mg/dL       

                                        0.60-1.30        

 

                    Serum or plasma urea nitrogen/creatinine mass ratio         

  15                  NRG 

       

 

                                        Serum or plasma creatinine measurement w

ith calculation of estimated glomerular 

filtration rate           >                         NRG        

 

                    Serum or plasma glucose measurement (mass/volume)           

101 mg/dL           

        

 

                    Serum or plasma calcium measurement (mass/volume)           

9.6 mg/dL           

8.5-10.1        

 

                          Serum or plasma total bilirubin measurement (mass/volu

me)           0.5 mg/dL   

                                        0.1-1.0        

 

                                        Serum or plasma alkaline phosphatase pantera

surement (enzymatic activity/volume)    

                          93 U/L                            

 

                                        Serum or plasma aspartate aminotransfera

se measurement (enzymatic 

activity/volume)           24 U/L                    5-34        

 

                                        Serum or plasma alanine aminotransferase

 measurement (enzymatic activity/volume)

                          28 U/L                    0-55        

 

                    Serum or plasma protein measurement (mass/volume)           

7.8 g/dL            

6.4-8.2        

 

                    Serum or plasma albumin measurement (mass/volume)           

3.8 g/dL            

3.2-4.5        

 

                    CALCIUM CORRECTED           9.8 mg/dL           8.5-10.1    

    

 

                                        Bacterial blood culture - 18 14:45

         

 

                    Bacterial blood culture           NG                  NRG   

     

 

                                        Bacterial blood culture - 18 14:52

         

 

                    Bacterial blood culture           NG                  NRG   

     

 

                                        Bacterial blood culture - 18 15:08

         

 

                    Bacterial blood culture           NG                  NRG   

     

 

                                        Automated blood complete blood count (he

mogram) panel - 18 11:28         

 

                          Blood leukocytes automated count (number/volume)      

     7.1 10*3/uL          

                                        4.3-11.0        

 

                          Blood erythrocytes automated count (number/volume)    

       4.04 10*6/uL       

                                        4.35-5.85        

 

                    Venous blood hemoglobin measurement (mass/volume)           

12.7 g/dL           

13.3-17.7        

 

                    Blood hematocrit (volume fraction)           38 %           

     40-54        

 

                    Automated erythrocyte mean corpuscular volume           93 [

foz_us]           

80-99        

 

                                        Automated erythrocyte mean corpuscular h

emoglobin (mass per erythrocyte)        

                          31 pg                     25-34        

 

                                        Automated erythrocyte mean corpuscular h

emoglobin concentration measurement 

(mass/volume)             34 g/dL                   32-36        

 

                    Automated erythrocyte distribution width ratio           13.

1 %              10.0-

14.5        

 

                    Automated blood platelet count (count/volume)           305 

10*3/uL           

130-400        

 

                          Automated blood platelet mean volume measurement      

     9.6 [foz_us]         

                                        7.4-10.4        

 

                                        PT panel in platelet poor plasma by coag

ulation assay - 18 11:28         

 

                          Prothrombin time (PT) in platelet poor plasma by coagu

lation assay           

15.1 s                                  12.2-14.7        

 

                          INR in platelet poor plasma or blood by coagulation as

say           1.2         

                                        0.8-1.4        

 

                                        Activated partial thromboplastin time (a

PTT) in platelet poor plasma 

bycoagulation assay - 18 11:28         

 

                                        Activated partial thromboplastin time (a

PTT) in platelet poor plasma 

bycoagulation assay           34 s                      24-35        

 

                                        Comprehensive metabolic panel - 18

 11:28         

 

                          Serum or plasma sodium measurement (moles/volume)     

      139 mmol/L          

                                        135-145        

 

                          Serum or plasma potassium measurement (moles/volume)  

         4.1 mmol/L       

                                        3.6-5.0        

 

                          Serum or plasma chloride measurement (moles/volume)   

        106 mmol/L        

                                                

 

                    Carbon dioxide           23 mmol/L           21-32        

 

                          Serum or plasma anion gap determination (moles/volume)

           10 mmol/L      

                                        5-14        

 

                          Serum or plasma urea nitrogen measurement (mass/volume

)           16 mg/dL      

                                        7-18        

 

                          Serum or plasma creatinine measurement (mass/volume)  

         1.30 mg/dL       

                                        0.60-1.30        

 

                    Serum or plasma urea nitrogen/creatinine mass ratio         

  12                  NRG 

       

 

                                        Serum or plasma creatinine measurement w

ith calculation of estimated glomerular 

filtration rate           55                        NRG        

 

                    Serum or plasma glucose measurement (mass/volume)           

93 mg/dL            

        

 

                    Serum or plasma calcium measurement (mass/volume)           

9.3 mg/dL           

8.5-10.1        

 

                          Serum or plasma total bilirubin measurement (mass/volu

me)           0.5 mg/dL   

                                        0.1-1.0        

 

                                        Serum or plasma alkaline phosphatase pantera

surement (enzymatic activity/volume)    

                          100 U/L                           

 

                                        Serum or plasma aspartate aminotransfera

se measurement (enzymatic 

activity/volume)           24 U/L                    5-34        

 

                                        Serum or plasma alanine aminotransferase

 measurement (enzymatic activity/volume)

                          29 U/L                    0-55        

 

                    Serum or plasma protein measurement (mass/volume)           

7.9 g/dL            

6.4-8.2        

 

                    Serum or plasma albumin measurement (mass/volume)           

3.8 g/dL            

3.2-4.5        

 

                    CALCIUM CORRECTED           9.5 mg/dL           8.5-10.1    

    

 

                                        Lipid 1996 panel - 18 11:28       

  

 

                          Serum or plasma triglyceride measurement (mass/volume)

           87 mg/dL       

                                        <150        

 

                          Serum or plasma cholesterol measurement (mass/volume) 

          149 mg/dL       

                                        < 200        

 

                          Serum or plasma cholesterol in HDL measurement (mass/v

olume)           36 mg/dL 

                                        40-60        

 

                          Cholesterol in LDL [mass/volume] in serum or plasma by

 direct assay           95

 mg/dL                                  1-129        

 

                          Serum or plasma cholesterol in VLDL measurement (mass/

volume)           17 mg/dL

                                        5-40        

 

                                        Methicillin resistant Staphylococcus aur

eus (MRSA) screening culture - 18 

11:28         

 

                          Methicillin resistant Staphylococcus aureus (MRSA) scr

eening culture           

NEG                                     NRG        



                                          



Encounters

      



                ACCT No.           Visit Date/Time           Discharge          

 Status         

             Pt. Type           Provider           Facility           Loc./Unit 

          

Complaint        

 

                    [id number]           2020 08:55:00           

020 23:59:59        

                CLS             Outpatient           KYREE EMERY MD           [facility]           [facility]OUNDCARE                          

 

                    [id number]           2019 08:15:00           

019 23:59:59        

                CLS             Outpatient           EDNA HODGES MD         

  [facility]           CARD                      CAD        

 

                    [id number]           2019 11:04:00           

019 23:59:59        

                CLS             Outpatient           EDNA HODGES MD         

  [facility]           CARD                      CAD        

 

                    [id number]           2019 08:49:00            23:59:59        

                CLS             Outpatient           LIYA WING      

     [facility] 

[facility]           WOUNDCARE                          

 

                    [id number]           2018 10:32:00            18:45:00        

                DIS             Outpatient           EDNA HODGES MD         

  [facility]           CATH                      NON HEALING PERIPHERAL 

ULCER,PVD,HTN        

 

                    [id number]           2018 11:04:00            23:59:59        

                CLS             Outpatient           EDNA HODGES MD         

  [facility]           CARD                      AR        

 

                    [id number]           2018 14:21:00           

018 17:59:00        

                DIS             Emergency           LUIS MIGUEL HARVEY           [facility]

 [facility]           ER                        LEG INFECTION        

 

                    [id number]           2014 18:35:00           

014 20:49:00        

                DIS             Emergency           CELSA MENDOZA MD    

       [facility] 

[facility]           ER                        DIZZINESS      

  

 

                    [id number]           2014 19:54:00           

014 00:01:00        

                DIS             Outpatient           TAWIL MD, ELIAS A          

 [facility]           SURG RCR                           

 

                    [id number]           2014 10:20:00           

014 17:00:00        

                DIS             Inpatient           TAWIL MD, ELIAS A           

[facility]           SURGICAL                  EPIDIDYORCHITIS        

 

                    [id number]           2014 15:08:00           

014 23:59:59        

                CLS             Outpatient           MENDY HARE DO    

       [facility] 

[facility]           RAD                       RT. SWOLLEN COLLEEN

TICLE, PAIN 

       

 

                    [id number]           2013 14:15:00           

013 15:15:00        

                DIS             Outpatient           MENDY HARE DO    

       [facility] 

[facility]           CATH                      CHEST PAIN     

   

 

                    [id number]           2013 08:16:00           

013 23:59:59        

           CLS           Outpatient                                             

        

   

 

             [id number]           06/10/2020 10:40:00                        A

CT           

Outpatient                CARROLL RUIZ, EDNA BROWNLEE           [facility]                      PVD,ABN GILDA        

 

             [id number]           2019 15:41:00                         

            

Document Registration                                                           

         

 

             [id number]           2014 00:00:00                         

            

Document Registration                                                           

         

 

             [id number]           2013 12:26:00                         

            

Document Registration                                                           

         

 

             [id number]           2012 04:30:00                         

            

Document Registration                                                           

         

 

             [id number]           2012 08:40:00                         

            

Document Registration                                                           

         

 

             [id number]           2006 09:31:00                         

            

Document Registration

## 2020-06-10 NOTE — PERIPHERAL REPORT
Peripheral Report


Physician (s)/Assistant (s)


Physician


EDNA HODGES MD





Pre-Procedure Diagnosis


Pre-Procedure Diagnosis:  Peripheral arterial disease





Post-Procedure Note


Procedure Start Date:  Omar 10, 2020


Name of Procedure:  


Bilteral runoff


First order


Additional imaging x2


Findings/Procedure Note


PROCEDURE NOTE:


70-year-old gentleman with history of venous insufficiency, peripheral arterial 

disease, had peripheral edema and nonhealing ulcer, had abnormal GILDA is schedu

led for peripheral angiogram and runoff.


After explaining the procedure to the patient, all pros and cons were explained,

all questions were answered.  The patient signed the consent and then he was 

placed on the cardiac catheterization laboratory.





The patient was placed on the cardiac catheterization laboratory. Groin was 

prepped SL fashion local anesthesia was used. Sheath placed in the right femoral

artery, runoff to the right leg was done, additional images using DSA to the 

trifurcation was done, using a rim catheter I advanced to the left common iliac 

artery and runoff to the left leg was done, additional image to the trifurcation

was done using DSA.


At the end of the procedure closure device was used





FINDINGS:


Right leg runoff, mild disease in the SFA, down by the trifurcation there is 

moderate disease in the peroneal artery, small vessel disease distally.


Left leg runoff, mild disease noted in the iliac artery, common femoral artery 

and superficial femoral artery and popliteal artery.  Below the trifurcation the

anterior tibial artery is normal, the posterior tibial artery has moderate 

disease, the peroneal artery has severe disease at 2 segments, fairly small 

artery.


 


CONCLUSIONS:


1.  Small vessel disease involving the left peroneal artery, fairly small 

artery, good flow through the anterior tibial and posterior tibial arteries.


2.  On the right side there is moderate disease below the trifurcation.  

Nonobstructive disease





DISCUSSION AND RECOMMENDATIONS:


Medical therapy is recommended, the ulcers are probably venous in nature.


Anesthesia Type:  Conscious Sedation


Estimated blood loss (mL):  20 ml


Contrast Amount:  30 ml


Total Radiation Dose:  36 mGy





Post-Procedure Diagnosis


Post-operative diagnosis:  


Peripheral arterial disease 


Chronic venous insufficiency


Hypertension


Hyperlipidemia











EDNA HODGES MD              Omar 10, 2020 13:40

## 2020-06-10 NOTE — DIAGNOSTIC IMAGING REPORT
INDICATION: Peripheral angiographic procedure.



TIME OF EXAM: 11:54 AM



Correlation is made with prior chest from 11/14/2018.



FINDINGS: The heart size is normal. The pulmonary vascularity is

unremarkable. The lungs are clear. No infiltrate, effusion or

pneumothorax is detected.



IMPRESSION: No acute cardiopulmonary process is detected.



Dictated by: 



  Dictated on workstation # [id number]

## 2020-07-14 ENCOUNTER — HOSPITAL ENCOUNTER (OUTPATIENT)
Dept: HOSPITAL 75 - WOUNDCARE | Age: 70
End: 2020-07-14
Attending: SURGERY
Payer: MEDICARE

## 2020-07-14 DIAGNOSIS — I87.332: Primary | ICD-10-CM

## 2020-07-14 DIAGNOSIS — L97.221: ICD-10-CM

## 2020-07-14 DIAGNOSIS — I89.0: ICD-10-CM

## 2020-07-14 PROCEDURE — 11044 DBRDMT BONE 1ST 20 SQ CM/<: CPT

## 2020-07-24 ENCOUNTER — HOSPITAL ENCOUNTER (OUTPATIENT)
Dept: HOSPITAL 75 - WOUNDCARE | Age: 70
End: 2020-07-24
Attending: ORTHOPAEDIC SURGERY
Payer: MEDICARE

## 2020-07-24 DIAGNOSIS — I89.0: ICD-10-CM

## 2020-07-24 DIAGNOSIS — I87.332: Primary | ICD-10-CM

## 2020-07-24 DIAGNOSIS — L97.221: ICD-10-CM

## 2020-07-24 DIAGNOSIS — R22.42: ICD-10-CM

## 2020-07-24 PROCEDURE — 99213 OFFICE O/P EST LOW 20 MIN: CPT

## 2020-07-28 ENCOUNTER — HOSPITAL ENCOUNTER (OUTPATIENT)
Dept: HOSPITAL 75 - WOUNDCARE | Age: 70
End: 2020-07-28
Attending: SURGERY
Payer: MEDICARE

## 2020-07-28 DIAGNOSIS — I87.332: Primary | ICD-10-CM

## 2020-07-28 DIAGNOSIS — R22.42: ICD-10-CM

## 2020-07-28 DIAGNOSIS — I89.0: ICD-10-CM

## 2020-07-28 DIAGNOSIS — L97.221: ICD-10-CM

## 2020-07-28 PROCEDURE — 99212 OFFICE O/P EST SF 10 MIN: CPT

## 2020-08-04 ENCOUNTER — HOSPITAL ENCOUNTER (OUTPATIENT)
Dept: HOSPITAL 75 - WOUNDCARE | Age: 70
End: 2020-08-04
Attending: SURGERY
Payer: MEDICARE

## 2020-08-04 DIAGNOSIS — Z87.891: ICD-10-CM

## 2020-08-04 DIAGNOSIS — I87.332: Primary | ICD-10-CM

## 2020-08-04 DIAGNOSIS — I10: ICD-10-CM

## 2020-08-04 DIAGNOSIS — I73.9: ICD-10-CM

## 2020-08-04 DIAGNOSIS — R21: ICD-10-CM

## 2020-08-04 DIAGNOSIS — I89.0: ICD-10-CM

## 2020-08-04 DIAGNOSIS — I25.10: ICD-10-CM

## 2020-08-04 PROCEDURE — 99212 OFFICE O/P EST SF 10 MIN: CPT

## 2020-08-11 ENCOUNTER — HOSPITAL ENCOUNTER (OUTPATIENT)
Dept: HOSPITAL 75 - WOUNDCARE | Age: 70
End: 2020-08-11
Attending: SURGERY
Payer: MEDICARE

## 2020-08-11 DIAGNOSIS — I89.0: ICD-10-CM

## 2020-08-11 DIAGNOSIS — R21: ICD-10-CM

## 2020-08-11 DIAGNOSIS — I87.332: Primary | ICD-10-CM

## 2020-08-11 PROCEDURE — 99212 OFFICE O/P EST SF 10 MIN: CPT

## 2021-02-22 ENCOUNTER — HOSPITAL ENCOUNTER (OUTPATIENT)
Dept: HOSPITAL 75 - CARD | Age: 71
End: 2021-02-22
Attending: INTERNAL MEDICINE
Payer: MEDICARE

## 2021-02-22 DIAGNOSIS — I35.1: ICD-10-CM

## 2021-02-22 DIAGNOSIS — I10: Primary | ICD-10-CM

## 2021-02-22 PROCEDURE — 93306 TTE W/DOPPLER COMPLETE: CPT

## 2021-03-01 ENCOUNTER — HOSPITAL ENCOUNTER (OUTPATIENT)
Dept: HOSPITAL 75 - ER | Age: 71
LOS: 1 days | Discharge: HOME | End: 2021-03-02
Attending: INTERNAL MEDICINE
Payer: MEDICARE

## 2021-03-01 VITALS — SYSTOLIC BLOOD PRESSURE: 128 MMHG | DIASTOLIC BLOOD PRESSURE: 75 MMHG

## 2021-03-01 VITALS — WEIGHT: 174.83 LBS | HEIGHT: 66.02 IN | BODY MASS INDEX: 28.1 KG/M2

## 2021-03-01 DIAGNOSIS — I25.10: Primary | ICD-10-CM

## 2021-03-01 DIAGNOSIS — N40.0: ICD-10-CM

## 2021-03-01 DIAGNOSIS — Z79.82: ICD-10-CM

## 2021-03-01 DIAGNOSIS — I21.9: ICD-10-CM

## 2021-03-01 DIAGNOSIS — Z79.899: ICD-10-CM

## 2021-03-01 DIAGNOSIS — Z87.891: ICD-10-CM

## 2021-03-01 DIAGNOSIS — E78.5: ICD-10-CM

## 2021-03-01 DIAGNOSIS — I65.23: ICD-10-CM

## 2021-03-01 DIAGNOSIS — I35.0: ICD-10-CM

## 2021-03-01 DIAGNOSIS — I10: ICD-10-CM

## 2021-03-01 LAB
ALBUMIN SERPL-MCNC: 3.8 GM/DL (ref 3.2–4.5)
ALP SERPL-CCNC: 72 U/L (ref 40–136)
ALT SERPL-CCNC: 41 U/L (ref 0–55)
APTT BLD: 34 SEC (ref 24–35)
BASOPHILS # BLD AUTO: 0.1 10^3/UL (ref 0–0.1)
BASOPHILS NFR BLD AUTO: 1 % (ref 0–10)
BILIRUB SERPL-MCNC: 0.3 MG/DL (ref 0.1–1)
BUN/CREAT SERPL: 19
CALCIUM SERPL-MCNC: 8.7 MG/DL (ref 8.5–10.1)
CHLORIDE SERPL-SCNC: 106 MMOL/L (ref 98–107)
CO2 SERPL-SCNC: 22 MMOL/L (ref 21–32)
CREAT SERPL-MCNC: 1.37 MG/DL (ref 0.6–1.3)
EOSINOPHIL # BLD AUTO: 0.2 10^3/UL (ref 0–0.3)
EOSINOPHIL NFR BLD AUTO: 3 % (ref 0–10)
GFR SERPLBLD BASED ON 1.73 SQ M-ARVRAT: 52 ML/MIN
GLUCOSE SERPL-MCNC: 124 MG/DL (ref 70–105)
HCT VFR BLD CALC: 45 % (ref 40–54)
HGB BLD-MCNC: 15.1 G/DL (ref 13.3–17.7)
INR PPP: 1.1 (ref 0.8–1.4)
LYMPHOCYTES # BLD AUTO: 1 X 10^3 (ref 1–4)
LYMPHOCYTES NFR BLD AUTO: 12 % (ref 12–44)
MAGNESIUM SERPL-MCNC: 1.8 MG/DL (ref 1.6–2.4)
MANUAL DIFFERENTIAL PERFORMED BLD QL: NO
MCH RBC QN AUTO: 32 PG (ref 25–34)
MCHC RBC AUTO-ENTMCNC: 34 G/DL (ref 32–36)
MCV RBC AUTO: 94 FL (ref 80–99)
MONOCYTES # BLD AUTO: 0.6 X 10^3 (ref 0–1)
MONOCYTES NFR BLD AUTO: 7 % (ref 0–12)
NEUTROPHILS # BLD AUTO: 6.4 X 10^3 (ref 1.8–7.8)
NEUTROPHILS NFR BLD AUTO: 78 % (ref 42–75)
PLATELET # BLD: 232 10^3/UL (ref 130–400)
PMV BLD AUTO: 10.5 FL (ref 9–12.2)
POTASSIUM SERPL-SCNC: 4.4 MMOL/L (ref 3.6–5)
PROT SERPL-MCNC: 7.7 GM/DL (ref 6.4–8.2)
PROTHROMBIN TIME: 14.2 SEC (ref 12.2–14.7)
SODIUM SERPL-SCNC: 140 MMOL/L (ref 135–145)
WBC # BLD AUTO: 8.2 10^3/UL (ref 4.3–11)

## 2021-03-01 PROCEDURE — 93306 TTE W/DOPPLER COMPLETE: CPT

## 2021-03-01 PROCEDURE — 36415 COLL VENOUS BLD VENIPUNCTURE: CPT

## 2021-03-01 PROCEDURE — 83735 ASSAY OF MAGNESIUM: CPT

## 2021-03-01 PROCEDURE — 99284 EMERGENCY DEPT VISIT MOD MDM: CPT

## 2021-03-01 PROCEDURE — 93041 RHYTHM ECG TRACING: CPT

## 2021-03-01 PROCEDURE — 80048 BASIC METABOLIC PNL TOTAL CA: CPT

## 2021-03-01 PROCEDURE — 80061 LIPID PANEL: CPT

## 2021-03-01 PROCEDURE — 93005 ELECTROCARDIOGRAM TRACING: CPT

## 2021-03-01 PROCEDURE — 93454 CORONARY ARTERY ANGIO S&I: CPT

## 2021-03-01 PROCEDURE — 85730 THROMBOPLASTIN TIME PARTIAL: CPT

## 2021-03-01 PROCEDURE — 85025 COMPLETE CBC W/AUTO DIFF WBC: CPT

## 2021-03-01 PROCEDURE — 84484 ASSAY OF TROPONIN QUANT: CPT

## 2021-03-01 PROCEDURE — 85027 COMPLETE CBC AUTOMATED: CPT

## 2021-03-01 PROCEDURE — 85610 PROTHROMBIN TIME: CPT

## 2021-03-01 PROCEDURE — 71045 X-RAY EXAM CHEST 1 VIEW: CPT

## 2021-03-01 PROCEDURE — 83874 ASSAY OF MYOGLOBIN: CPT

## 2021-03-01 PROCEDURE — 80053 COMPREHEN METABOLIC PANEL: CPT

## 2021-03-01 RX ADMIN — SODIUM CHLORIDE SCH MLS/HR: 900 INJECTION, SOLUTION INTRAVENOUS at 16:48

## 2021-03-01 NOTE — ED CHEST PAIN
General


Chief Complaint:  Cardiac/General Problems


Stated Complaint:  CP


Nursing Triage Note:  


Pt to ED via EMS from PCP office.  Pt went to PCP today for chest pain.  EMS 


reports pt was pale and hypotensive, and c/o blurry vision.  Pt had reports 


chest" discomfort" and not pain.  PCP's office called EMS.  EMS reports pt was 


in SVT and 6 mg of adenosine was given enroute to ED.  Pt denied pain or 


discomfort upon arrival and pt was in sinus rhythm.


Nursing Sepsis Screen:  No Definite Risk


Source:  patient, old records


Exam Limitations:  no limitations





History of Present Illness


Date Seen by Provider:  Mar 1, 2021


Time Seen by Provider:  13:37


Initial Comments


This 60-year-old gentleman presents to the emergency room via EMS from Dr. Hare's office where he was found to have SVT and a heart rate up to 180.  

He had been having symptoms of palpitations and chest pain for about an hour 

prior to that.  He reported blurred vision and was pale with a borderline 

hypotensive blood pressure for EMS.  EMS elected to attempt chemical 

cardioversion with adenosine 6 mg which was effective.  He presents adorable 

sinus rhythm with resolved chest pain on arrival to the ER.  Chart reveals a 

heart cath in  showing moderate disease of the circumflex.  Patient received

aspirin 324 mg by EMS.





Allergies and Home Medications


Allergies


Coded Allergies:  


     No Known Drug Allergies (Unverified , 3/23/12)





Home Medications


Aspirin 81 Mg Tablet.dr, 81 MG PO DAILY


   Prescribed by: EDNA LYONS on 18 1426


Atorvastatin Calcium 10 Mg Tablet, 10 MG PO HS, (Reported)


Clopidogrel Bisulfate 75 Mg Tablet, 75 MG PO DAILY, (Reported)


Gabapentin 300 Mg/6 Ml Solution, 600 MG PO DAILY, (Reported)


Hydrochlorothiazide 25 Mg Tablet, 25 MG PO DAILY, (Reported)


Lisinopril 10 Mg Tablet, 10 MG PO DAILY, (Reported)


Metoprolol Succinate 25 Mg Tab.er.24h, 25 MG PO DAILY, (Reported)





Patient Home Medication List


Home Medication List Reviewed:  Yes





Review of Systems


Review of Systems


Constitutional:  no symptoms reported


EENTM:  See HPI


Respiratory:  No Symptoms Reported


Cardiovascular:  See HPI


Gastrointestinal:  No Symptoms Reported


Genitourinary:  No Symptoms Reported


Musculoskeletal:  no symptoms reported


Skin:  no symptoms reported


Psychiatric/Neurological:  No Symptoms Reported


Endocrine:  No Symptoms Reported


Hematologic/Lymphatic:  No Symptoms Reported





Past Medical-Social-Family Hx


Past Med/Social Hx:  Reviewed Nursing Past Med/Soc Hx


Patient Social History


Alcohol Use:  Denies Use


Smoking Status:  Former Smoker


Type Used:  Cigarettes


Former Smoker, Quit:  2013


2nd Hand Smoke Exposure:  No


Recent Infectious Disease Expo:  No


Recent Hopitalizations:  No





Immunizations Up To Date


Tetanus Booster (TDap):  Unknown





Past Medical History


Surgeries:  Yes (APPY, HEART CATH X'S 2)


Appendectomy


Respiratory:  No


Cardiac:  Yes (SVT)


Heart Murmur, Hypertension


Neurological:  No


Reproductive Disorders:  No


Genitourinary:  No


Benign Prostatic Hyperpl


Gastrointestinal:  No


Musculoskeletal:  No


Endocrine:  No


HEENT:  No


Cancer:  No


Psychosocial:  No


Integumentary:  No


Blood Disorders:  No





Family Medical History





Patient reports no known family medical history.





Physical Exam


Vital Signs





Vital Signs - First Documented








 3/1/21





 13:37


 


Temp 36.8


 


Pulse 81


 


Resp 20


 


B/P (MAP) 116/69 (85)


 


Pulse Ox 97


 


O2 Delivery Room Air





Capillary Refill : Less Than 3 Seconds


Height, Weight, BMI


Height: 5'6.00"


Weight: 169lbs. 0.0oz. [id number]; 28.00 BMI


Method:Stated


General Appearance:  No Apparent Distress, WD/WN


HEENT:  PERRL/EOMI, Normal ENT Inspection


Neck:  Normal Inspection


Respiratory:  Lungs Clear, Normal Breath Sounds, No Accessory Muscle Use


Cardiovascular:  Regular Rate, Rhythm, No Edema, No JVD, Systolic Murmur


Gastrointestinal:  Non Tender, Soft


Extremity:  Normal Inspection, No Pedal Edema


Neurologic/Psychiatric:  Alert, Oriented x3, No Motor/Sensory Deficits, Normal M

ood/Affect, CNs II-XII Norm as Tested


Skin:  Normal Color, Warm/Dry





Progress/Results/Core Measures


Results/Orders


Lab Results





Laboratory Tests








Test


 3/1/21


13:37 Range/Units


 


 


White Blood Count


 8.2 


 4.3-11.0


10^3/uL


 


Red Blood Count


 4.79 


 4.30-5.52


10^6/uL


 


Hemoglobin 15.1  13.3-17.7  g/dL


 


Hematocrit 45  40-54  %


 


Mean Corpuscular Volume 94  80-99  fL


 


Mean Corpuscular Hemoglobin 32  25-34  pg


 


Mean Corpuscular Hemoglobin


Concent 34 


 32-36  g/dL





 


Red Cell Distribution Width 12.8  10.0-14.5  %


 


Platelet Count


 232 


 130-400


10^3/uL


 


Mean Platelet Volume 10.5  9.0-12.2  fL


 


Immature Granulocyte % (Auto) 0   %


 


Neutrophils (%) (Auto) 78 H 42-75  %


 


Lymphocytes (%) (Auto) 12  12-44  %


 


Monocytes (%) (Auto) 7  0-12  %


 


Eosinophils (%) (Auto) 3  0-10  %


 


Basophils (%) (Auto) 1  0-10  %


 


Neutrophils # (Auto) 6.4  1.8-7.8  X 10^3


 


Lymphocytes # (Auto) 1.0  1.0-4.0  X 10^3


 


Monocytes # (Auto) 0.6  0.0-1.0  X 10^3


 


Eosinophils # (Auto)


 0.2 


 0.0-0.3


10^3/uL


 


Basophils # (Auto)


 0.1 


 0.0-0.1


10^3/uL


 


Immature Granulocyte # (Auto)


 0.0 


 0.0-0.1


10^3/uL


 


Prothrombin Time 14.2  12.2-14.7  SEC


 


INR Comment 1.1  0.8-1.4  


 


Activated Partial


Thromboplast Time 34 


 24-35  SEC





 


Sodium Level 140  135-145  MMOL/L


 


Potassium Level 4.4  3.6-5.0  MMOL/L


 


Chloride Level 106    MMOL/L


 


Carbon Dioxide Level 22  21-32  MMOL/L


 


Anion Gap 12  5-14  MMOL/L


 


Blood Urea Nitrogen 26 H 7-18  MG/DL


 


Creatinine


 1.37 H


 0.60-1.30


MG/DL


 


Estimat Glomerular Filtration


Rate 52 


  





 


BUN/Creatinine Ratio 19   


 


Glucose Level 124 H   MG/DL


 


Calcium Level 8.7  8.5-10.1  MG/DL


 


Corrected Calcium 8.9  8.5-10.1  MG/DL


 


Magnesium Level 1.8  1.6-2.4  MG/DL


 


Total Bilirubin 0.3  0.1-1.0  MG/DL


 


Aspartate Amino Transf


(AST/SGOT) 45 H


 5-34  U/L





 


Alanine Aminotransferase


(ALT/SGPT) 41 


 0-55  U/L





 


Alkaline Phosphatase 72    U/L


 


Myoglobin


 1095.1 H


 10.0-92.0


NG/ML


 


Troponin I 0.536 *H <0.028  NG/ML


 


Total Protein 7.7  6.4-8.2  GM/DL


 


Albumin 3.8  3.2-4.5  GM/DL








My Orders





Orders - CELSA MENDOZA MD


Cbc With Automated Diff (3/1/21 13:37)


Magnesium (3/1/21 13:37)


Chest 1 View, Ap/Pa Only (3/1/21 13:37)


Ekg Tracing (3/1/21 13:37)


Comprehensive Metabolic Panel (3/1/21 13:37)


Myoglobin Serum (3/1/21 13:37)


Protime With Inr (3/1/21 13:37)


Partial Thromboplastin Time (3/1/21 13:37)


O2 (3/1/21 13:37)


Monitor-Rhythm Ecg Trace Only (3/1/21 13:37)


Lipid Panel (3/2/21 06:00)


Ed Iv/Invasive Line Start (3/1/21 13:37)


Troponin I (3/1/21 13:37)


Lidocaine 1% Inj 20 Ml (Xylocaine 1% Inj (3/1/21 15:14)


Midazolam Injection (Versed Injection) (3/1/21 15:14)


Fentanyl  Injection (Sublimaze Injection (3/1/21 15:14)


Heparin (Cath Lab) (Heparin (Cath Lab)) (3/1/21 15:14)





Vital Signs/I&O











 3/1/21





 13:37


 


Temp 36.8


 


Pulse 81


 


Resp 20


 


B/P (MAP) 116/69 (85)


 


Pulse Ox 97


 


O2 Delivery Room Air














Blood Pressure Mean:                    85











Progress


Progress Note :  


Progress Note


Patient was found To have an elevated troponin and myoglobin.  In the context of

known coronary artery disease and chest pain earlier today, this is concerning. 

This was reviewed with Dr. Lyons who felt the safest course of action was 

cardiac cath.  This was explained to the patient and he was agreeable.


Initial ECG Impression Date:  Mar 1, 2021


Initial ECG Impression Time:  13:29


Initial ECG Rate:  88


Initial ECG Rhythm:  Normal Sinus


Initial ECG Intervals:  Normal


Comment


Normal sinus rhythm with no ST elevation or depression.  No abnormal intervals 

or axis deviation.





Diagnostic Imaging





   Diagonstic Imaging:  Xray


   Plain Films/CT/US/NM/MRI:  chest


Comments


NAME:   NATY ARENAS


[facility] REC#:   [id number]


ACCOUNT#:   [id number]


PT STATUS:   REG ER


:   1950


PHYSICIAN:   CELSA MENDOZA MD


ADMIT DATE:   21/ER


***Draft***


Date of Exam:21





CHEST 1 VIEW, AP/PA ONLY








INDICATION: Chest pain.





TIME OF EXAM: 01:58 p.m.





COMPARISON: Correlation is made with prior chest 06/10/2020.





FINDINGS: Heart size is normal. Lungs are clear. No infiltrates


are seen. There is no effusion or pneumothorax.





IMPRESSION: No acute cardiopulmonary process is detected.





  Dictated on workstation # [id number]








Dict:   21 1406


Trans:   21 1413


[facility] [id number]





Interpreted by:     MILAGRO GARCIA MD


   Reviewed:  Reviewed by Me





Departure


Communication (Admissions)


Time/Spoke to Admitting Phy:  15:25


Dr. Lyons





Impression





   Primary Impression:  


   SVT (supraventricular tachycardia)


   Additional Impressions:  


   Chest pain


   Qualified Codes:  R07.9 - Chest pain, unspecified


   Elevated troponin


Disposition:   ADMITTED AS INPATIENT


Condition:  Stable





Admissions


Decision to Admit Reason:  Admit from ER (General)


Decision to Admit/Date:  Mar 1, 2021


Time/Decision to Admit Time:  15:25





Departure-Patient Inst.


Referrals:  


MENDY HARE DO (PCP/Family)


Primary Care Physician











CELSA MENDOZA MD         Mar 1, 2021 15:31

## 2021-03-01 NOTE — CARDIAC CATH REPORT
Cardiac Cath Report


Physician (s)/Assistant (s)


Physician


EDNA HODGES MD





Pre-Procedure Diagnosis


Pre-Procedure Diagnosis:  coronary artery disease





Post-Procedure Note


Procedure Start Date:  Mar 1, 2021


Name of Procedure:  


Coronary angiogram


Findings/Procedure Note


PROCEDURE NOTE:


70 years old gentleman admitted with tachycardia, brought by EMS with a heart 

rate 180, given adenosine , had some EKG changes, noted to have elevation in 

troponin, brought for emergency cardiac catheterization.  


After explaining the procedure to the patient, all pros and cons were explained,

all questions were answered.  The patient signed the consent and then he  was 

placed on the cardiac catheterization laboratory. Groin was prepped SL fashion 

local anesthesia was used. Sheath placed in the artery. Wilmar right and left 

catheter were used to access the coronary system.  I was unable to cross the 

aortic valve to the left ventricular cavity.


I had to reshape the Wil[place] right due to the superior and posterior origin of 

the right coronary artery after intubating the right coronary artery and doing a

ngiogram patient went to ventricular fibrillation required one shock with 200 J 

which was successful in terminating ventricular fibrillation


At the end of the procedure the sheath was removed. Closure device





FINDINGS:





Hemodynamics 


LV did not cross the valve


Aorta 83/44 mean of 57





ANATOMY:


Left Main is free of obstructive disease


Left Anterior Descending has mild disease nonobstructive disease


Left Circumflex is nondominant artery, the proper circumflex/AV groove branch 

has ostial stenosis very small artery otherwise nonobstructive disease


Right Coronory Artery has superior and posterior origin, small artery, dominant 

artery with nonobstructive disease.  Ventricular fibrillation induced by deep 

intubation of the catheter, responded to 200 J shock





CONCLUSION:


1.  Nonobstructive disease other than severe ostial stenosis of a very small AV 

groove branch


2.  Patient is known to have aortic valve stenosis, I was unable to cross the 

aortic valve





DISCUSSION AND RECOMMENDATION:


Continue with beta blockers, monitor overnight and reevaluate 2-D echo


Anesthesia Type:  Conscious Sedation


Estimated blood loss (mL):  25 ml


Contrast Amount:  50 ml


Total Radiation Dose:  347 mGy





Post-Procedure Diagnosis


Post-operative diagnosis:  


Chest pain


Palpitation


Coronary artery disease


Hypertension











EDNA HODGES MD              Mar 1, 2021 4:51 pm

## 2021-03-01 NOTE — CONSULTATION-CARDIOLOGY
HPI-Cardiology


Cardiology Consultation


Date of Consultation


3/1/21


Date of Admission





Time Seen by Provider:  16:15


Indication:  chest pain





HPI


70 years old gentleman with history of peripheral arterial disease, mild to 

moderate coronary artery disease, has been having increasing shortness of breath

and fatigue, seen in Dr. Alexander's office and noted that his heart rate was 

180.  He was given Adenosine which resulted in improvement in his heart rate, 

became bradycardic.  Brought to the emergency room, initially he was noted to 

have ST depression on the EMS monitor, EKG here did not show any acute 

abnormality but his troponin was positive, myoglobin is positive.  On my 

evaluation he was feeling better.  Due to his extensive history I decided to 

proceed with cardiac catheterization





Home Medications & Allergies


Allergies:  


Coded Allergies:  


     No Known Drug Allergies (Unverified , 3/23/12)


Home Medication List Reviewed:  Yes





PMH-Social-Family Hx


Patient Social History


Marital Status:  


Recreational Drug Use:  No


Smoking Status:  Former Smoker


Type Used:  Cigarettes


2nd Hand Smoke Exposure:  No


Recent Hopitalizations:  No





Immunizations Up To Date


Tetanus Booster (TDap):  Unknown





Past Medical History


Discussed below





Family Medical History


Family Medical Hx


Noncontributory


Family History:  


Patient reports no known family medical history.





Review of Systems-General


Review of Systems


Constitutional:  see HPI, malaise, weakness


EENTM:  see HPI, no symptoms reported


Respiratory:  see HPI; No cough, No dyspnea on exertion, No hemoptysis, No 

orthopnea, No phlegm, No short of breath, No stridor, No wheezing, No other


Cardiovascular:  see HPI, chest pain; No edema, No Hx of Intervention; 

palpitations; No syncope, No vascular heart diseas, No other


Gastrointestinal:  no symptoms reported, see HPI


Genitourinary:  no symptoms reported, see HPI


Musculoskeletal:  no symptoms reported, see HPI


Skin:  no symptoms reported, see HPI


Psychiatric/Neurological:  No Symptoms Reported, See HPI





Reviewed Test Results


Reviewed Test Results


Lab





Laboratory Tests








Test


 3/1/21


13:37 Range/Units


 


 


White Blood Count


 8.2 


 4.3-11.0


10^3/uL


 


Red Blood Count


 4.79 


 4.30-5.52


10^6/uL


 


Hemoglobin 15.1  13.3-17.7  g/dL


 


Hematocrit 45  40-54  %


 


Mean Corpuscular Volume 94  80-99  fL


 


Mean Corpuscular Hemoglobin 32  25-34  pg


 


Mean Corpuscular Hemoglobin


Concent 34 


 32-36  g/dL





 


Red Cell Distribution Width 12.8  10.0-14.5  %


 


Platelet Count


 232 


 130-400


10^3/uL


 


Mean Platelet Volume 10.5  9.0-12.2  fL


 


Immature Granulocyte % (Auto) 0   %


 


Neutrophils (%) (Auto) 78 H 42-75  %


 


Lymphocytes (%) (Auto) 12  12-44  %


 


Monocytes (%) (Auto) 7  0-12  %


 


Eosinophils (%) (Auto) 3  0-10  %


 


Basophils (%) (Auto) 1  0-10  %


 


Neutrophils # (Auto) 6.4  1.8-7.8  X 10^3


 


Lymphocytes # (Auto) 1.0  1.0-4.0  X 10^3


 


Monocytes # (Auto) 0.6  0.0-1.0  X 10^3


 


Eosinophils # (Auto)


 0.2 


 0.0-0.3


10^3/uL


 


Basophils # (Auto)


 0.1 


 0.0-0.1


10^3/uL


 


Immature Granulocyte # (Auto)


 0.0 


 0.0-0.1


10^3/uL


 


Prothrombin Time 14.2  12.2-14.7  SEC


 


INR Comment 1.1  0.8-1.4  


 


Activated Partial


Thromboplast Time 34 


 24-35  SEC





 


Sodium Level 140  135-145  MMOL/L


 


Potassium Level 4.4  3.6-5.0  MMOL/L


 


Chloride Level 106    MMOL/L


 


Carbon Dioxide Level 22  21-32  MMOL/L


 


Anion Gap 12  5-14  MMOL/L


 


Blood Urea Nitrogen 26 H 7-18  MG/DL


 


Creatinine


 1.37 H


 0.60-1.30


MG/DL


 


Estimat Glomerular Filtration


Rate 52 


  





 


BUN/Creatinine Ratio 19   


 


Glucose Level 124 H   MG/DL


 


Calcium Level 8.7  8.5-10.1  MG/DL


 


Corrected Calcium 8.9  8.5-10.1  MG/DL


 


Magnesium Level 1.8  1.6-2.4  MG/DL


 


Total Bilirubin 0.3  0.1-1.0  MG/DL


 


Aspartate Amino Transf


(AST/SGOT) 45 H


 5-34  U/L





 


Alanine Aminotransferase


(ALT/SGPT) 41 


 0-55  U/L





 


Alkaline Phosphatase 72    U/L


 


Myoglobin


 1095.1 H


 10.0-92.0


NG/ML


 


Troponin I 0.536 *H <0.028  NG/ML


 


Total Protein 7.7  6.4-8.2  GM/DL


 


Albumin 3.8  3.2-4.5  GM/DL











Physical Exam


Physical Exam


Vital Signs





Vital Signs - First Documented








 3/1/21





 13:37


 


Temp 36.8


 


Pulse 81


 


Resp 20


 


B/P (MAP) 116/69 (85)


 


Pulse Ox 97


 


O2 Delivery Room Air





Capillary Refill : Less Than 3 Seconds


Height, Weight, BMI


Height: 5'6.00"


Weight: 169lbs. 0.0oz. 76.642759zg; 28.00 BMI


Method:Stated


General Appearance:  No Apparent Distress, WD/WN


Eyes:  Bilateral Eye Normal Inspection, Bilateral Eye PERRL, Bilateral Eye EOMI


HEENT:  PERRL/EOMI, Normal ENT Inspection


Neck:  Normal Inspection


Respiratory:  Lungs Clear, Normal Breath Sounds, No Accessory Muscle Use


Cardiovascular:  Regular Rate, Rhythm, No Edema, No JVD, Systolic Murmur


Gastrointestinal:  Non Tender, Soft


Back:  Normal Inspection, No CVA Tenderness, No Vertebral Tenderness


Extremity:  Normal Inspection, No Pedal Edema


Neurologic/Psychiatric:  Alert, Oriented x3, No Motor/Sensory Deficits, Normal 

Mood/Affect, CNs II-XII Norm as Tested


Skin:  Normal Color, Warm/Dry


Lymphatic:  No Adenopathy





A/P-Cardiology


Admission Diagnosis


Non-ST elevation myocardial infarction


Peripheral arterial disease


Hypertension


Supraventricular tachycardia





Assessment/Plan


Palpitation, paroxysmal atrial tachycardia, responded to adenosine injection, 

returned to sinus rhythm.





Non-ST elevation myocardial infarction, probably secondary to tachycardia, last 

cardiac catheterization was done in 2013 showing moderate coronary artery 

disease in the circumflex artery, had a stress test in August 2019 showing no 

significant ischemia or infarction.  Planning to proceed with cardiac 

catheterization possible PTCA.





Peripheral arterial disease, angiogram showed total occlusion of the right 

posterior tibial artery and severe disease of the peroneal artery.  Single 

lesion in the anterior tibial artery.  GILDA in July 2019 was mildly abnormal, 

0.85 on the right and 1.16 on the left, waveform is slightly diminished 

bilaterally, TBI is 0.51 on the right and 0.56 on the left.  Venous study did 

not show any significant insufficiency.  Underwent peripheral angiogram on 

6/10/2020 revealing small vessel disease involving the left peroneal artery, 

fairly small artery, good flow through the anterior tibial and posterior tibial 

arteries. On the right side there is moderate disease below the trifurcation.  

Nonobstructive disease. Continues to have nonhealing wound to left anterior 

shin, likely venous stasis ulceration. I will refer to Dr. Mckeon for further 

management. 





Moderate aortic valve stenosis, last echo was done in August 2019 suggestive of 

mild aortic valve stenosis, EF 55-65 percent, peak gradient across the valve is 

71 mmHg, mean gradient 44 mmHg, I will repeat 2-D echo





Hypertension, restart home medication monitor blood pressure





Hyperlipidemia, monitor lipids





Mild bilateral carotid stenosis, ultrasound was done in November 2019, continue 

to monitor











EDNA HODGES MD               Mar 1, 2021 16:21

## 2021-03-01 NOTE — DIAGNOSTIC IMAGING REPORT
INDICATION: Chest pain.



TIME OF EXAM: 01:58 p.m.



COMPARISON: Correlation is made with prior chest 06/10/2020.



FINDINGS: Heart size is normal. Lungs are clear. No infiltrates

are seen. There is no effusion or pneumothorax.



IMPRESSION: No acute cardiopulmonary process is detected.



Dictated by: 



  Dictated on workstation # [id number]

## 2021-03-01 NOTE — CARDIAC PROCEDURE NOTE-CS/ASA
Pre-Procedure Note


Pre-Op Procedure Note


H&P Reviewed


The H&P was reviewed, patient examined and no changes noted.


Date H&P Reviewed:  Mar 1, 2021


Time H&P Reviewed:  16:21





Conscious Sedation Pre-Proced


Time


16:21





ASA Score


3


For ASA 3 and 4: Consider anesthesia and medical clearance. Also, for patients

with a history of failed moderate sedation consider anesthesia.

















Airway 


 


Lungs 


 


Heart 


 


 ASA score


 


 ASA 1: a normal healthy patient


 


 ASA 2:  a patient with a mild systemic disease (mid diabetes, controlled 

hypertension, obesity 


 


x ASA 3:  a patient with a severe systemic disease that limits activity  

(angina, COPD, prior Myocardial infarction)


 


 ASA 4:  a patient with an incapacitating disease that is a constant threat to 

life (CHF, renal failure)


 


 ASA 5:  a moribund patient not expected to survive 24 hrs.  (ruptured aneurysm)


 


 ASA 6:  a declared brain-dead patient whose organs are being harvested.


 


 For emergent operations, add the letter E after the classification











Mallampati Classification


Grade 3





Sedation Plan


Analgesia, Amnesia, Plan communicated to team members, Discussed options with 

patient/fam, Discussed risks with patient/fam


The patient is an appropriate candidate to undergo the planned procedure, 

sedation, and anesthesia.





The patient immediately re-assessed prior to indication.











EDNA HODGES MD               Mar 1, 2021 16:22

## 2021-03-02 LAB
BUN/CREAT SERPL: 19
CALCIUM SERPL-MCNC: 8 MG/DL (ref 8.5–10.1)
CHLORIDE SERPL-SCNC: 110 MMOL/L (ref 98–107)
CHOLEST SERPL-MCNC: 122 MG/DL (ref ?–200)
CO2 SERPL-SCNC: 21 MMOL/L (ref 21–32)
CREAT SERPL-MCNC: 1.18 MG/DL (ref 0.6–1.3)
GFR SERPLBLD BASED ON 1.73 SQ M-ARVRAT: > 60 ML/MIN
GLUCOSE SERPL-MCNC: 99 MG/DL (ref 70–105)
HCT VFR BLD CALC: 39 % (ref 40–54)
HDLC SERPL-MCNC: 28 MG/DL (ref 40–60)
HGB BLD-MCNC: 13 G/DL (ref 13.3–17.7)
MCH RBC QN AUTO: 32 PG (ref 25–34)
MCHC RBC AUTO-ENTMCNC: 33 G/DL (ref 32–36)
MCV RBC AUTO: 95 FL (ref 80–99)
PLATELET # BLD: 174 10^3/UL (ref 130–400)
PMV BLD AUTO: 10.6 FL (ref 9–12.2)
POTASSIUM SERPL-SCNC: 3.9 MMOL/L (ref 3.6–5)
SODIUM SERPL-SCNC: 139 MMOL/L (ref 135–145)
TRIGL SERPL-MCNC: 159 MG/DL (ref ?–150)
VLDLC SERPL CALC-MCNC: 32 MG/DL (ref 5–40)
WBC # BLD AUTO: 7.3 10^3/UL (ref 4.3–11)

## 2021-03-02 RX ADMIN — SODIUM CHLORIDE SCH MLS/HR: 900 INJECTION, SOLUTION INTRAVENOUS at 12:46

## 2021-03-02 RX ADMIN — SODIUM CHLORIDE SCH MLS/HR: 900 INJECTION, SOLUTION INTRAVENOUS at 02:28

## 2021-03-02 NOTE — CARDIOLOGY DISCHARGE SUMMARY
Discharge Summary


Hospital Course


Problems Reviewed?:  Yes


Hospital Course


Date of Admission:  


Admission Diagnosis :  





Family Physician/Provider: Hugo Alexander DO  





Date of Discharge: 3/2/21 


Discharge Diagnosis: [ palpitation


Chest pain


Type II myocardial infarction


Hypertension]





Hospital Course:


[ Palpitation, paroxysmal atrial tachycardia, responded to adenosine injection, 

returned to sinus rhythm.





Multiple episodes of ventricular tachycardia noted overnight, started on 

amiodarone drip and tolerating it well.  Continue on current medication.  Off 

note patient had an episode of ventricular fibrillation during heart 

catheterization induced by catheter in the right coronary artery, had superior 

and posterior origin of the right coronary artery.  Continue on amiodarone I 

will arrange for follow-up as an outpatient





Elevation in troponin level, probably type II myocardial infarction, cardiac 

catheterization showed no significant obstructive disease, patient was with a 

heart rate 180 which probably the cause of his elevated troponin





Non-ST elevation myocardial infarction, probably secondary to tachycardia, last 

cardiac catheterization was done in 2013 showing moderate coronary artery 

disease in the circumflex artery, had a stress test in August 2019 showing no 

significant ischemia or infarction.  I proceed with emergency cardiac 

catheterization showing nonobstructive disease, patient has.  And posterior 

origin of the right coronary artery, had to modify the shape of the catheter, 

which resulted in slightly deeper intubation of the right coronary artery, after

 the injection patient had transient episode of ventricular fibrillation 

requiring cardioversion





Peripheral arterial disease, angiogram showed total occlusion of the right 

posterior tibial artery and severe disease of the peroneal artery.  Single 

lesion in the anterior tibial artery.  GILDA in July 2019 was mildly abnormal, 

0.85 on the right and 1.16 on the left, waveform is slightly diminished 

bilaterally, TBI is 0.51 on the right and 0.56 on the left.  Venous study did 

not show any significant insufficiency.  Underwent peripheral angiogram on 

6/10/2020 revealing small vessel disease involving the left peroneal artery, 

fairly small artery, good flow through the anterior tibial and posterior tibial 

arteries. On the right side there is moderate disease below the trifurcation.  

Nonobstructive disease. Continues to have nonhealing wound to left anterior 

shin, likely venous stasis ulceration. I will refer to Dr. Mckeon for further 

management. 





moderate severe aortic valve stenosis, continue to monitor.  No changes





Hypertension, restart home medication monitor





Hyperlipidemia, monitor lipids





Mild bilateral carotid stenosis, ultrasound was done in November 2019, continue 

to monitor]














Labs and Pending Lab Test:





Laboratory Tests


3/1/21 13:37: 


White Blood Count 8.2, Red Blood Count 4.79, Hemoglobin 15.1, Hematocrit 45, 

Mean Corpuscular Volume 94, Mean Corpuscular Hemoglobin 32, Mean Corpuscular 

Hemoglobin Concent 34, Red Cell Distribution Width 12.8, Platelet Count 232, 

Mean Platelet Volume 10.5, Immature Granulocyte % (Auto) 0, Neutrophils (%) 

(Auto) 78H, Lymphocytes (%) (Auto) 12, Monocytes (%) (Auto) 7, Eosinophils (%) 

(Auto) 3, Basophils (%) (Auto) 1, Neutrophils # (Auto) 6.4, Lymphocytes # (Auto)

1.0, Monocytes # (Auto) 0.6, Eosinophils # (Auto) 0.2, Basophils # (Auto) 0.1, 

Immature Granulocyte # (Auto) 0.0, Prothrombin Time 14.2, INR Comment 1.1, 

Activated Partial Thromboplast Time 34, Sodium Level 140, Potassium Level 4.4, 

Chloride Level 106, Carbon Dioxide Level 22, Anion Gap 12, Blood Urea Nitrogen 

26H, Creatinine 1.37H, Estimat Glomerular Filtration Rate 52, BUN/Creatinine 

Ratio 19, Glucose Level 124H, Calcium Level 8.7, Corrected Calcium 8.9, 

Magnesium Level 1.8, Total Bilirubin 0.3, Aspartate Amino Transf (AST/SGOT) 45H,

Alanine Aminotransferase (ALT/SGPT) 41, Alkaline Phosphatase 72, Myoglobin 

1095.1H, Troponin I 0.536*H, Total Protein 7.7, Albumin 3.8


3/2/21 03:05: 


White Blood Count 7.3, Red Blood Count 4.11L, Hemoglobin 13.0L, Hematocrit 39L, 

Mean Corpuscular Volume 95, Mean Corpuscular Hemoglobin 32, Mean Corpuscular 

Hemoglobin Concent 33, Red Cell Distribution Width 12.7, Platelet Count 174, 

Mean Platelet Volume 10.6, Sodium Level 139, Potassium Level 3.9, Chloride Level

110H, Carbon Dioxide Level 21, Anion Gap 8, Blood Urea Nitrogen 23H, Creatinine 

1.18, Estimat Glomerular Filtration Rate > 60, BUN/Creatinine Ratio 19, Glucose 

Level 99, Calcium Level 8.0L, Triglycerides Level 159H, Cholesterol Level 122, 

LDL Cholesterol Direct 70, VLDL Cholesterol 32, HDL Cholesterol 28L





Home Meds


Active


Amiodarone HCl 200 Mg Tablet 200 Mg PO BID


Aspirin EC (Aspirin) 81 Mg Tablet.dr 81 Mg PO DAILY


Reported


Gabapentin 300 Mg/6 Ml Solution 600 Mg PO DAILY


Lisinopril 10 Mg Tablet 10 Mg PO DAILY


Clopidogrel (Clopidogrel Bisulfate) 75 Mg Tablet 75 Mg PO DAILY


Toprol Xl (Metoprolol Succinate) 25 Mg Tab.er.24h 25 Mg PO DAILY


Hydrochlorothiazide 25 Mg Tablet 25 Mg PO DAILY


Atorvastatin Calcium 10 Mg Tablet 10 Mg PO HS


Assessment/Pt DC Instructions


chest pain


Palpitation


Type II myocardial infarction


Ventricular tachycardia





Discharge Physical Examination


Allergies:  


Coded Allergies:  


     No Known Drug Allergies (Unverified , 3/23/12)


General Appearance:  No Apparent Distress, WD/WN


HEENT:  PERRL/EOMI, TMs Normal, Normal ENT Inspection, Pharynx Normal


Respiratory:  Chest Non Tender, Lungs Clear, Normal Breath Sounds, No Accessory 

Muscle Use, No Respiratory Distress


Cardiovascular:  Regular Rate, Rhythm, No Edema, No Gallop, No JVD, No Murmur, 

Normal Peripheral Pulses


Gastrointestinal:  Normal Bowel Sounds, No Organomegaly, No Pulsatile Mass, Non 

Tender


Extremity:  Normal Capillary Refill, Normal Inspection, Normal Range of Motion, 

Non Tender


Skin:  Normal Color, Warm/Dry


Neurologic/Psychiatric:  Alert, Oriented x3











EDNA HODGES MD               Mar 2, 2021 11:04

## 2021-03-02 NOTE — DISCHARGE INST-POST CATH
Discharge Inst-CATH/EP


Problems Reviewed?:  Yes


Post Cardiac Cath/EP D/C Inst


Follow Up/Plan


appointment with Dr. HODGES's office in 2 weeks


<b>CARDIAC CATH/EP PROCEDURE DISCHARGE INSTRUCTIONS</b>





ACTIVITY





* Go Home directly and rest.


* Limit activity of the leg (or wrist if it was used) for 7 days including 

aerobics, swimming,


   jogging, bicycling, etc.


* Restrict stair-climbing for 7 days if possible, if not, climb up with your 

non-cath leg, then


   bring together on the same step.


* Avoid lifting, pushing, pulling or excessive movement of the affected 

extremity for 7 days.


* Customary sexual activity may be resumed after 2 days-use caution not to use a

position  


   that strains or causes pain to the affected extremity.


* No driving for 24 hours.


* NO SMOKING. 


* Avoid straining for bowel movements for 7 days.


* Gentle walking on level ground is allowed.


* Returning to work will depend on the type of procedure and the results. Your 

doctor will discuss


   this with you.





CALL YOUR DOCTOR FOR ANY OF THE FOLLOWING:





*If bleeding from the puncture site occurs- Apply gentle pressure to site with 

clean cloth and call


   your doctor or EMS.


* If a knot or lump forms under the skin, increases in size, or causes pain.


* If bruising appears to be worsening or moving further down your leg instead of

disappearing.


* Temperature above 101 F.





CARE OF YOUR GROIN INCISION;





* Bruising or purple discoloration of the skin near the puncture site is common.


* You may shower only, no bathtub bathing for 5 days.  Be careful to avoid 

slipping as your


   leg may feel stiff.


* If a closure device was used on your femoral artery, please see the attached 

guide regarding


   care of the device and your leg.


* Leave dressing on FOR 24 hours.





CARE OF YOUR WRIST INCISION;





* Bruising or purple discoloration of the skin near the puncture site is common.


* You may shower.


* DO NOT submerge wrist.


* Leave dressing on FOR 24 hours.











EDNA HODGES MD               Mar 2, 2021 10:50

## 2021-06-21 ENCOUNTER — HOSPITAL ENCOUNTER (OUTPATIENT)
Dept: HOSPITAL 75 - LAB | Age: 71
End: 2021-06-21
Attending: FAMILY MEDICINE
Payer: MEDICARE

## 2021-06-21 DIAGNOSIS — I25.10: Primary | ICD-10-CM

## 2021-06-21 DIAGNOSIS — I10: ICD-10-CM

## 2021-06-21 LAB
ALBUMIN SERPL-MCNC: 4.2 GM/DL (ref 3.2–4.5)
ALP SERPL-CCNC: 89 U/L (ref 40–136)
ALT SERPL-CCNC: 39 U/L (ref 0–55)
BILIRUB SERPL-MCNC: 0.8 MG/DL (ref 0.1–1)
BUN/CREAT SERPL: 21
CALCIUM SERPL-MCNC: 9.7 MG/DL (ref 8.5–10.1)
CHLORIDE SERPL-SCNC: 107 MMOL/L (ref 98–107)
CHOLEST SERPL-MCNC: 139 MG/DL (ref ?–200)
CO2 SERPL-SCNC: 23 MMOL/L (ref 21–32)
CREAT SERPL-MCNC: 1.32 MG/DL (ref 0.6–1.3)
GFR SERPLBLD BASED ON 1.73 SQ M-ARVRAT: 54 ML/MIN
GLUCOSE SERPL-MCNC: 97 MG/DL (ref 70–105)
HCT VFR BLD CALC: 46 % (ref 40–54)
HDLC SERPL-MCNC: 43 MG/DL (ref 40–60)
HGB BLD-MCNC: 15.3 G/DL (ref 13.3–17.7)
MCH RBC QN AUTO: 31 PG (ref 25–34)
MCHC RBC AUTO-ENTMCNC: 33 G/DL (ref 32–36)
MCV RBC AUTO: 95 FL (ref 80–99)
PLATELET # BLD: 209 10^3/UL (ref 130–400)
PMV BLD AUTO: 10.1 FL (ref 9–12.2)
POTASSIUM SERPL-SCNC: 4.3 MMOL/L (ref 3.6–5)
PROT SERPL-MCNC: 8.1 GM/DL (ref 6.4–8.2)
SODIUM SERPL-SCNC: 139 MMOL/L (ref 135–145)
TRIGL SERPL-MCNC: 131 MG/DL (ref ?–150)
VLDLC SERPL CALC-MCNC: 26 MG/DL (ref 5–40)
WBC # BLD AUTO: 6.2 10^3/UL (ref 4.3–11)

## 2021-06-21 PROCEDURE — 85027 COMPLETE CBC AUTOMATED: CPT

## 2021-06-21 PROCEDURE — 80061 LIPID PANEL: CPT

## 2021-06-21 PROCEDURE — 36415 COLL VENOUS BLD VENIPUNCTURE: CPT

## 2021-06-21 PROCEDURE — 80053 COMPREHEN METABOLIC PANEL: CPT

## 2021-06-24 ENCOUNTER — HOSPITAL ENCOUNTER (OUTPATIENT)
Dept: HOSPITAL 75 - RAD | Age: 71
End: 2021-06-24
Attending: FAMILY MEDICINE
Payer: MEDICARE

## 2021-06-24 DIAGNOSIS — N43.3: ICD-10-CM

## 2021-06-24 DIAGNOSIS — N44.8: Primary | ICD-10-CM

## 2021-06-24 PROCEDURE — 76870 US EXAM SCROTUM: CPT

## 2021-06-24 NOTE — DIAGNOSTIC IMAGING REPORT
PROCEDURE: US Scrotum w/ Duplex



TECHNIQUE: Multiple realtime gray images were obtained of the

scrotum in various projections bilaterally. Color Doppler images

were also obtained.



INDICATION: Enlarged testicles.



COMPARISON: 02/20/2014.



FINDINGS: The testicles are normal in size, shape and

echogenicity. The right testis measures 4.3 x 2.2 x 2.8 cm. The

left testis measures 4.1 x 2.7 x 2.3 cm. There is normal color

flow Doppler signal of both testicles. No focal testicular mass

is seen on either side. The right epididymis is unremarkable. The

left epididymis is not visualized. There is no sonographic

evidence of epididymitis. A large hydrocele is seen in the left

scrotum with septations present.



IMPRESSION: 

1. Large left-sided hydrocele with septations present.

2. Unremarkable sonographic appearance of the testicles without

evidence of focal testicular mass or torsion.



Dictated by: 



  Dictated on workstation # [id number]

## 2021-06-28 ENCOUNTER — HOSPITAL ENCOUNTER (EMERGENCY)
Dept: HOSPITAL 75 - ER | Age: 71
Discharge: HOME | End: 2021-06-28
Payer: MEDICARE

## 2021-06-28 VITALS — SYSTOLIC BLOOD PRESSURE: 170 MMHG | DIASTOLIC BLOOD PRESSURE: 72 MMHG

## 2021-06-28 VITALS — HEIGHT: 65.75 IN | BODY MASS INDEX: 26.18 KG/M2 | WEIGHT: 160.94 LBS

## 2021-06-28 DIAGNOSIS — N43.3: Primary | ICD-10-CM

## 2021-06-28 DIAGNOSIS — B37.2: ICD-10-CM

## 2021-06-28 DIAGNOSIS — Z87.891: ICD-10-CM

## 2021-06-28 DIAGNOSIS — Z79.899: ICD-10-CM

## 2021-06-28 DIAGNOSIS — I10: ICD-10-CM

## 2021-06-28 LAB
APTT PPP: YELLOW S
BACTERIA #/AREA URNS HPF: NEGATIVE /HPF
BILIRUB UR QL STRIP: NEGATIVE
FIBRINOGEN PPP-MCNC: CLEAR MG/DL
GLUCOSE UR STRIP-MCNC: NEGATIVE MG/DL
KETONES UR QL STRIP: NEGATIVE
LEUKOCYTE ESTERASE UR QL STRIP: NEGATIVE
NITRITE UR QL STRIP: NEGATIVE
PH UR STRIP: 6 [PH] (ref 5–9)
PROT UR QL STRIP: NEGATIVE
RBC #/AREA URNS HPF: (no result) /HPF
SP GR UR STRIP: 1.01 (ref 1.02–1.02)
SQUAMOUS #/AREA URNS HPF: (no result) /HPF
WBC #/AREA URNS HPF: (no result) /HPF

## 2021-06-28 PROCEDURE — 81000 URINALYSIS NONAUTO W/SCOPE: CPT

## 2021-06-28 PROCEDURE — 76870 US EXAM SCROTUM: CPT

## 2021-06-28 NOTE — ED GU-MALE
General


Chief Complaint:  Male Reproductive


Stated Complaint:  TESTICLE PAIN


Nursing Triage Note:  


PT TO ROOM 4 PT CO OF LEFT TESTICULAR PAIN, PT TESTICLES VERY SWOLLEN SL REDDEND




AND PAINFUL, STATES HAS HAD FOR  ABOUT A WEEK. HAS BEEN ULTRASOUNDED AND SEEN BY




DR HARE AND HAS APPT TO SEE TAWIL ON THURSDAY THIS WEEK. PT STATES PAIN AND




SWELLING GETTING WORSE





History of Present Illness


Date Seen by Provider:  Jun 28, 2021


Time Seen by Provider:  11:30


Initial Comments


Patient is a 71-year-old male who presents to the emergency department today 

with a chief complaint of increasing scrotal pain and left testicular pain.  

Patient states that he was seen last week by his primary care provider and had 

an ultrasound and was scheduled to see Dr. Tawil on Thursday of this week.  

Patient states that the pain has increased and the swelling has increased since 

that time.  He denies any dysuria, urgency or frequency and states he is still 

able to void normally.  He denies any fevers or chills.  





All other review of systems reviewed and negative except as stated above.


Timing/Duration:  week, getting worse


Severity/Quality:  severe


Activities at Onset:  none


Associated Symptoms:  denies symptoms





Allergies and Home Medications


Allergies


Coded Allergies:  


     No Known Drug Allergies (Unverified , 3/23/12)





Home Medications


Amiodarone HCl 200 Mg Tablet, 200 MG PO BID


   Prescribed by: EDNA HODGES on 3/2/21 1050


Aspirin 81 Mg Tablet.dr, 81 MG PO DAILY


   Prescribed by: EDNA HODGES on 11/14/18 1426


Atorvastatin Calcium 10 Mg Tablet, 10 MG PO HS, (Reported)


Clopidogrel Bisulfate 75 Mg Tablet, 75 MG PO DAILY, (Reported)


Gabapentin 300 Mg/6 Ml Solution, 600 MG PO DAILY, (Reported)


Hydrochlorothiazide 25 Mg Tablet, 25 MG PO DAILY, (Reported)


Lisinopril 10 Mg Tablet, 10 MG PO DAILY, (Reported)


Metoprolol Succinate 25 Mg Tab.er.24h, 25 MG PO DAILY, (Reported)





Review of Systems


Review of Systems


Constitutional:  see HPI


Respiratory:  no symptoms reported


Cardiovascular:  no symptoms reported


Gastrointestinal:  no symptoms reported


Genitourinary:  other (Scrotal swelling and left testicular pain)


Musculoskeletal:  no symptoms reported


Skin:  other (Redness to the scrotum)





All Other Systemes Reviewed


Negative Unless Noted:  Yes





Past Medical-Social-Family Hx


Patient Social History


Type Used:  Cigarettes


Former Smoker, Quit:  Nov 14, 2013


2nd Hand Smoke Exposure:  No


Recent Infectious Disease Expo:  No


Recent Hopitalizations:  No





Immunizations Up To Date


Tetanus Booster (TDap):  Unknown





Past Medical History


Surgeries:  Yes (APPY, HEART CATH X'S 2)


Appendectomy


Respiratory:  No


Cardiac:  Yes (SVT)


Heart Murmur, Hypertension


Neurological:  No


Reproductive Disorders:  No


Genitourinary:  No


Benign Prostatic Hyperpl


Gastrointestinal:  No


Musculoskeletal:  No


Endocrine:  No


HEENT:  No


Cancer:  No


Psychosocial:  No


Integumentary:  No


Blood Disorders:  No





Family Medical History





Patient reports no known family medical history.





Physical Exam


Vital Signs





Vital Signs - First Documented








 6/28/21





 11:37


 


Temp 35.4


 


Pulse 52


 


Resp 18


 


B/P (MAP) 170/72 (104)


 


Pulse Ox 100





Capillary Refill : Less Than 3 Seconds


Height, Weight, BMI


Height: 5'6.00"


Weight: 169lbs. 0.0oz. 76.170965ul; 26.00 BMI


Method:Stated


General Appearance:  WD/WN, no apparent distress


Neck:  full range of motion, supple


Cardiovascular:  regular rate, rhythm


Respiratory:  lungs clear, normal breath sounds, no respiratory distress, no 

accessory muscle use


Gastrointestinal:  non tender, soft


Male:  other (Patient has significantly enlarged scrotum with enlarged left 

testicle that is very tender to palpation.  I am unable to palpate the 

epididymis.  Scrotum is quite erythematous.  He does have a little erythema 

extending down into the perineum; no discrete abscess is palpated in the 

superficial layers of the skin and no fluctuance is palpated)


Extremities:  normal range of motion, non-tender, normal inspection


Neurologic/Psychiatric:  alert, normal mood/affect, oriented x 3


Skin:  warm/dry, other (Significant erythema over the scrotum; patient also has 

evidence of Candida dermatitis in the left groin with erythema and satellite 

lesions present)





Progress/Results/Core Measures


Suspected Sepsis


Recent Fever Within 48 Hours:  No


Infection Criteria Present:  None


New/Unexplained  Altered Menta:  No


Sepsis Screen:  No Definite Risk


SIRS


Temperature: 


Pulse: 52 


Respiratory Rate: 18


 


Blood Pressure 170 /72 


Mean: 104





Results/Orders


Lab Results





Laboratory Tests








Test


 6/28/21


11:30 Range/Units


 


 


Urine Color YELLOW   


 


Urine Clarity CLEAR   


 


Urine pH 6.0  5-9  


 


Urine Specific Gravity 1.015 L 1.016-1.022  


 


Urine Protein NEGATIVE  NEGATIVE  


 


Urine Glucose (UA) NEGATIVE  NEGATIVE  


 


Urine Ketones NEGATIVE  NEGATIVE  


 


Urine Nitrite NEGATIVE  NEGATIVE  


 


Urine Bilirubin NEGATIVE  NEGATIVE  


 


Urine Urobilinogen 0.2  < = 1.0  MG/DL


 


Urine Leukocyte Esterase NEGATIVE  NEGATIVE  


 


Urine RBC (Auto) NEGATIVE  NEGATIVE  


 


Urine RBC NONE   /HPF


 


Urine WBC NONE   /HPF


 


Urine Squamous Epithelial


Cells NONE 


  /HPF





 


Urine Crystals NONE   /LPF


 


Urine Bacteria NEGATIVE   /HPF


 


Urine Casts NONE   /LPF


 


Urine Mucus NEGATIVE   /LPF


 


Urine Culture Indicated NO   








My Orders





Orders - KARIE SY MD


Ua Culture If Indicated (6/28/21 11:37)


Us Scrotum (Testicle) [id number] (6/28/21 11:37)





Vital Signs/I&O











 6/28/21





 11:37


 


Temp 35.4


 


Pulse 52


 


Resp 18


 


B/P (MAP) 170/72 (104)


 


Pulse Ox 100





Capillary Refill : Less Than 3 Seconds








Blood Pressure Mean:                    104








Progress Note :  


   Time:  12:32


Progress Note


Patient has a really complex heterogeneous hydrocele around the left testicle.  

Left epididymis was not visualized.  There is flow to both testicles.  Patient 

will be sent home with a prescription for tramadol for pain.  He will be advised

to follow-up with Dr. Michael will on Thursday as scheduled.  I am also going to give

him a prescription for Keflex and nystatin cream for the yeast infection he has 

in the left groin.





Departure


Impression





   Primary Impression:  


   Hydrocele


   Qualified Codes:  N43.3 - Hydrocele, unspecified


   Additional Impression:  


   Candidal dermatitis


Disposition:  01 HOME, SELF-CARE


Condition:  Stable





Departure-Patient Inst.


Decision time for Depature:  12:37


Referrals:  


MENDY HARE DO (PCP/Family)


Primary Care Physician








TAWIL,ELIAS A MD


Patient Instructions:  Hydrocele/Varicocele (DC)





Add. Discharge Instructions:  


Apply the nystatin powder to your left groin daily twice a day for 10 days.





Take the antibiotic starting today 3 times a day for the next week.





I have also given your prescription for tramadol for pain.  You can take 1 every

6 hours as needed for severe pain otherwise take ibuprofen as needed for pain or

Tylenol.





Please keep your follow-up appointment with Dr. Olson this Thursday.





Come back to the emergency department for any new, concerning or emergent 

complaints.


Scripts


Tramadol HCl (Tramadol HCl) 50 Mg Tablet


50 MG PO Q6H PRN for PAIN for 3 Days, #15 TAB 0 Refills


   Prov: KARIE SY MD         6/28/21 


Nystatin (Nystatin) 1 Each Powder.ea.


1 EACH MC BID for 10 Days, #1 UNIT


   apply twice daily for 10 days to the left groin area


   Prov: KARIE SY MD         6/28/21 


Cephalexin (Cephalexin) 500 Mg Tablet


500 MG PO TID for 7 Days, #21 TAB


   Prov: KARIE SY MD         6/28/21











KARIE SY MD         Jun 28, 2021 12:34

## 2021-06-28 NOTE — DIAGNOSTIC IMAGING REPORT
PROCEDURE: US Scrotum.



TECHNIQUE: Multiple real-time grayscale images were obtained over

the scrotum in various projections bilaterally.



INDICATION: Left testicular pain and swelling.



FINDINGS: Right testicle measures 3.7 x 2.1 x 2.8 cm and left

testicle measures 4.6 x 2.2 x 2.3 cm. Both testes show

homogeneous echotexture. There is blood flow to both testes. No

testicular mass is seen. Right epididymis is unremarkable. Left

epididymis was not well visualized. There is a small right

hydrocele. There is a large complex hydrocele on the left with

multiple internal septations. Pyocele or hematocele cannot be

entirely excluded. No varicocele is identified.



IMPRESSION:

1. No evidence of testicular mass or vascular compromise.

2. Complex left hydrocele. Pyocele or hematocele cannot be

entirely excluded.



Dictated by: 



  Dictated on workstation # [id number]

## 2021-11-14 ENCOUNTER — HOSPITAL ENCOUNTER (EMERGENCY)
Dept: HOSPITAL 75 - ER | Age: 71
Discharge: HOME | End: 2021-11-14
Payer: MEDICARE

## 2021-11-14 VITALS — DIASTOLIC BLOOD PRESSURE: 62 MMHG | SYSTOLIC BLOOD PRESSURE: 126 MMHG

## 2021-11-14 VITALS — HEIGHT: 65.75 IN | BODY MASS INDEX: 26.89 KG/M2 | WEIGHT: 165.35 LBS

## 2021-11-14 DIAGNOSIS — Z79.01: ICD-10-CM
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
## 2021-11-14 DIAGNOSIS — Z79.82: ICD-10-CM

## 2021-11-14 DIAGNOSIS — I10: ICD-10-CM

## 2021-11-14 DIAGNOSIS — S01.00XA: Primary | ICD-10-CM

## 2021-11-14 PROCEDURE — 99283 EMERGENCY DEPT VISIT LOW MDM: CPT

## 2021-11-14 NOTE — ED GENERAL
General


Chief Complaint:  General Problems/Pain


Stated Complaint:  HEAD LAC


Source of Information:  Patient, EMS


Exam Limitations:  No Limitations





History of Present Illness


Date Seen by Provider:  Nov 14, 2021


Time Seen by Provider:  14:08


Initial Comments


Patient is a 71-year-old male who presents to the emergency department today 

with a chief complaint of bleeding from a scalp lesion.  Patient states that he 

has had a lesion on his scalp for an unknown length of time.  He states his 

primary care doctor has seen it and told him to put some mupirocin ointment on 

it.  Patient states today he was trying to take off his hoodie when he caught it

in his hand and pulled on it to and partially removed the growth on his scalp.  

He states there was a significant amount of bleeding that he could not get to 

quit.  Patient states that he has never had pathology done on this lesion.  

Patient denies being on blood thinners.  He did not sustain a trauma, only 

pulled on this lesion and partially excised it taking off his sweatshirt.





Patient currently has no other symptoms of chest pain, shortness of breath, 

nausea or vomiting.





All other review of systems reviewed and negative except as stated.


Timing/Duration:  1 Hour


Severity:  Moderate


Associated Systoms:  Denies Symptoms





Allergies and Home Medications


Allergies


Coded Allergies:  


     No Known Drug Allergies (Unverified , 3/23/12)





Patient Home Medication List


Home Medication List Reviewed:  Yes


Amiodarone HCl (Amiodarone HCl) 200 Mg Tablet, 200 MG PO BID


   Prescribed by: EDNA HODGES on 3/2/21 1050


Aspirin (Aspirin EC) 81 Mg Tablet.dr, 81 MG PO DAILY


   Prescribed by: EDNA HODGES on 11/14/18 1426


Atorvastatin Calcium (Atorvastatin Calcium) 10 Mg Tablet, 10 MG PO HS, 

(Reported)


   Entered as Reported by: IRENE MORA on 11/14/18 1114


Cephalexin (Cephalexin) 500 Mg Tablet, 500 MG PO TID


   Prescribed by: KARIE SY on 6/28/21 1237


Clopidogrel Bisulfate (Clopidogrel) 75 Mg Tablet, 75 MG PO DAILY, (Reported)


   Entered as Reported by: ERUM HICKMAN on 6/10/20 1207


Gabapentin (Gabapentin) 300 Mg/6 Ml Solution, 600 MG PO DAILY, (Reported)


   Entered as Reported by: ERUM HICKAMN on 6/10/20 1207


Hydrochlorothiazide (Hydrochlorothiazide) 25 Mg Tablet, 25 MG PO DAILY, 

(Reported)


   Entered as Reported by: ERUM HICKMAN on 6/10/20 1207


Lisinopril (Lisinopril) 10 Mg Tablet, 10 MG PO DAILY, (Reported)


   Entered as Reported by: ERUM HICKMAN on 6/10/20 1207


Metoprolol Succinate (Toprol Xl) 25 Mg Tab.er.24h, 25 MG PO DAILY, (Reported)


   Entered as Reported by: ERUM HICKMAN on 6/10/20 1207


Nystatin (Nystatin) 1 Each Powder.ea., 1 EACH MC BID


   Prescribed by: KARIE SY on 6/28/21 1237


Tramadol HCl (Tramadol HCl) 50 Mg Tablet, 50 MG PO Q6H PRN for PAIN


   Prescribed by: KARIE SY on 6/28/21 1239





Review of Systems


Review of Systems


Constitutional:  see HPI


EENTM:  no symptoms reported


Respiratory:  no symptoms reported


Cardiovascular:  no symptoms reported


Gastrointestinal:  no symptoms reported


Musculoskeletal:  no symptoms reported


Skin:  other (bleeding from scalp wound)





Past Medical-Social-Family Hx


Immunizations Up To Date


Tetanus Booster (TDap):  Unknown





Past Medical History


Surgeries:  Yes (APPY, HEART CATH X'S 2)


Appendectomy


Respiratory:  No


Cardiac:  Yes (SVT)


Heart Murmur, Hypertension


Neurological:  No


Reproductive Disorders:  No


Genitourinary:  No


Benign Prostatic Hyperpl


Gastrointestinal:  No


Musculoskeletal:  No


Endocrine:  No


HEENT:  No


Cancer:  No


Psychosocial:  No


Integumentary:  No


Blood Disorders:  No





Family Medical History





Patient reports no known family medical history.





Physical Exam


Vital Signs


Capillary Refill :


Height, Weight, BMI


Height: 5'6.00"


Weight: 169lbs. 0.0oz. 76.761867pj; 26.00 BMI


Method:Stated


General Appearance:  No Apparent Distress, WD/WN


Eyes:  Bilateral Eye Normal Inspection, Bilateral Eye PERRL, Bilateral Eye EOMI


HEENT:  PERRL/EOMI


Neck:  Full Range of Motion


Respiratory:  Lungs Clear, Normal Breath Sounds, No Accessory Muscle Use, No R

espiratory Distress


Cardiovascular:  Regular Rate, Rhythm


Extremity:  Normal Inspection


Neurologic/Psychiatric:  Alert, Oriented x3, No Motor/Sensory Deficits, Normal 

Mood/Affect


Skin:  Normal Color, Warm/Dry, Other (Patient has a nodular growth to right 

scalp, at the top of his head, partially removed, active bleeding noted.)





Progress/Results/Core Measures


Suspected Sepsis


SIRS


Temperature: 


Pulse:  


Respiratory Rate: 


 


Blood Pressure  / 


Mean:





Results/Orders


My Orders





Orders - KARIE SY MD


Lidocaine/Epi 1% 1:200,00 (Xylocaine/Epi (11/14/21 14:15)





Vital Signs/I&O


Capillary Refill :


Progress Note :  


   Time:  14:16


Progress Note


Lidocaine with Epi, approximated 2cc used to anesthetize the scalp at the 

lesion; I used a pair of Iris scissors to remove the "dangling" portion of the 

lesion.  Silver Nitrate used to control bleeding,  Secondary to the way this 

lesion looks, I'd be concerned for this being a type of skin cancer.  Will send 

it off to pathology, results to Dr Monroy office.





wound care discussed with patient and family member. I advised him to continue 

the Mupirocin





Departure


Impression





   Primary Impression:  


   Scalp wound


   Qualified Codes:  S01.00XA - Unspecified open wound of scalp, initial 

   encounter


Disposition:  01 HOME, SELF-CARE


Condition:  Stable





Departure-Patient Inst.


Decision time for Depature:  14:18


Referrals:  


MENDY HARE DO (PCP/Family)


Primary Care Physician


Patient Instructions:  Wound Care (DC)





Add. Discharge Instructions:  


You may continue to put the mupirocin ointment on the lesion 2-3 times daily.





Wash it gently with soap and water.





If you notice that it starts to bleed again, direct pressure with a dry gauze 

bandage should control the bleeding.





I have sent part of the lesion to the lab for testing.  It may be a week before 

the results are back.  Please schedule with Dr. Hare to get these results.





Return to the emergency room for any new, concerning or emergent complaints.











KARIE SY MD         Nov 14, 2021 14:18

## 2021-12-29 ENCOUNTER — HOSPITAL ENCOUNTER (OUTPATIENT)
Dept: HOSPITAL 75 - CR | Age: 71
LOS: 2 days | Discharge: HOME | End: 2021-12-31
Payer: MEDICARE

## 2021-12-29 DIAGNOSIS — Z95.2: Primary | ICD-10-CM

## 2021-12-29 PROCEDURE — 93798 PHYS/QHP OP CAR RHAB W/ECG: CPT

## 2022-01-31 ENCOUNTER — HOSPITAL ENCOUNTER (OUTPATIENT)
Dept: HOSPITAL 75 - CR | Age: 72
Discharge: HOME | End: 2022-01-31
Payer: MEDICARE

## 2022-01-31 DIAGNOSIS — Z95.2: Primary | ICD-10-CM

## 2022-01-31 PROCEDURE — 93798 PHYS/QHP OP CAR RHAB W/ECG: CPT

## 2022-02-07 ENCOUNTER — HOSPITAL ENCOUNTER (OUTPATIENT)
Dept: HOSPITAL 75 - CR | Age: 72
LOS: 21 days | Discharge: HOME | End: 2022-02-28
Payer: MEDICARE

## 2022-02-07 DIAGNOSIS — Z95.2: Primary | ICD-10-CM

## 2022-02-07 PROCEDURE — 93798 PHYS/QHP OP CAR RHAB W/ECG: CPT

## 2022-10-20 ENCOUNTER — HOSPITAL ENCOUNTER (OUTPATIENT)
Dept: HOSPITAL 75 - CARD | Age: 72
End: 2022-10-20
Attending: PHYSICIAN ASSISTANT
Payer: MEDICARE

## 2022-10-20 DIAGNOSIS — I35.1: Primary | ICD-10-CM

## 2022-10-20 PROCEDURE — 93306 TTE W/DOPPLER COMPLETE: CPT

## 2023-06-09 ENCOUNTER — HOSPITAL ENCOUNTER (OUTPATIENT)
Dept: HOSPITAL 75 - WOUNDCARE | Age: 73
End: 2023-06-09
Attending: FAMILY MEDICINE
Payer: MEDICARE

## 2023-06-09 DIAGNOSIS — L30.8: ICD-10-CM

## 2023-06-09 DIAGNOSIS — L74.8: Primary | ICD-10-CM

## 2023-06-09 DIAGNOSIS — L29.8: ICD-10-CM

## 2023-06-09 DIAGNOSIS — I89.0: ICD-10-CM

## 2023-06-09 DIAGNOSIS — I87.322: ICD-10-CM

## 2023-06-09 PROCEDURE — 99213 OFFICE O/P EST LOW 20 MIN: CPT

## 2023-08-15 ENCOUNTER — HOSPITAL ENCOUNTER (OUTPATIENT)
Dept: HOSPITAL 75 - RAD | Age: 73
End: 2023-08-15
Attending: NURSE PRACTITIONER
Payer: MEDICARE

## 2023-08-15 DIAGNOSIS — R60.0: ICD-10-CM

## 2023-08-15 DIAGNOSIS — M79.605: Primary | ICD-10-CM

## 2023-08-15 NOTE — DIAGNOSTIC IMAGING REPORT
PROCEDURE: US left lower extremity venous.



TECHNIQUE: Multiple real-time grayscale images were obtained over

the left lower extremity in various projections. Additional

duplex Doppler and color Doppler images were also obtained.



INDICATION: Left lower extremity pain and redness as well as

swelling.



There is no evidence of left lower extremity DVT. Left lower

extremity DVT venous system shows normal compressibility with

normal response to augmentation and Valsalva. No fluid collection

or mass is detected.



IMPRESSION: No evidence of left lower extremity DVT.



Dictated by: 



  Dictated on workstation # [id number]

## 2023-10-17 ENCOUNTER — HOSPITAL ENCOUNTER (OUTPATIENT)
Dept: HOSPITAL 75 - CARD | Age: 73
End: 2023-10-17
Attending: FAMILY MEDICINE
Payer: MEDICARE

## 2023-10-17 DIAGNOSIS — Z95.4: ICD-10-CM

## 2023-10-17 DIAGNOSIS — I07.1: ICD-10-CM

## 2023-10-17 DIAGNOSIS — I10: Primary | ICD-10-CM

## 2023-10-17 PROCEDURE — 93306 TTE W/DOPPLER COMPLETE: CPT
